# Patient Record
Sex: FEMALE | Race: WHITE | NOT HISPANIC OR LATINO | Employment: FULL TIME | URBAN - METROPOLITAN AREA
[De-identification: names, ages, dates, MRNs, and addresses within clinical notes are randomized per-mention and may not be internally consistent; named-entity substitution may affect disease eponyms.]

---

## 2018-11-30 ENCOUNTER — APPOINTMENT (OUTPATIENT)
Dept: URGENT CARE | Age: 43
End: 2018-11-30

## 2018-11-30 DIAGNOSIS — Z02.1 PHYSICAL EXAM, PRE-EMPLOYMENT: Primary | ICD-10-CM

## 2018-11-30 PROCEDURE — 86787 VARICELLA-ZOSTER ANTIBODY: CPT | Performed by: NURSE PRACTITIONER

## 2018-11-30 PROCEDURE — 86765 RUBEOLA ANTIBODY: CPT | Performed by: NURSE PRACTITIONER

## 2018-11-30 PROCEDURE — 86735 MUMPS ANTIBODY: CPT | Performed by: NURSE PRACTITIONER

## 2018-11-30 PROCEDURE — 86762 RUBELLA ANTIBODY: CPT | Performed by: NURSE PRACTITIONER

## 2018-11-30 PROCEDURE — 86480 TB TEST CELL IMMUN MEASURE: CPT | Performed by: NURSE PRACTITIONER

## 2018-12-02 LAB — RUBV IGG SERPL IA-ACNC: >175 IU/ML

## 2018-12-03 LAB
GAMMA INTERFERON BACKGROUND BLD IA-ACNC: 0.03 IU/ML
M TB IFN-G BLD-IMP: NEGATIVE
M TB IFN-G CD4+ BCKGRND COR BLD-ACNC: -0.01 IU/ML
M TB IFN-G CD4+ BCKGRND COR BLD-ACNC: -0.01 IU/ML
MITOGEN IGNF BCKGRD COR BLD-ACNC: >10 IU/ML

## 2018-12-04 LAB
MEV IGG SER QL: NORMAL
MUV IGG SER QL: NORMAL
VZV IGG SER IA-ACNC: NORMAL

## 2019-04-23 ENCOUNTER — APPOINTMENT (OUTPATIENT)
Dept: LAB | Facility: HOSPITAL | Age: 44
End: 2019-04-23
Attending: OBSTETRICS & GYNECOLOGY
Payer: COMMERCIAL

## 2019-04-23 ENCOUNTER — TRANSCRIBE ORDERS (OUTPATIENT)
Dept: ADMINISTRATIVE | Facility: HOSPITAL | Age: 44
End: 2019-04-23

## 2019-04-23 DIAGNOSIS — N92.4 EXCESSIVE BLEEDING IN PREMENOPAUSAL PERIOD: Primary | ICD-10-CM

## 2019-04-23 DIAGNOSIS — N92.4 EXCESSIVE BLEEDING IN PREMENOPAUSAL PERIOD: ICD-10-CM

## 2019-04-23 LAB
BASOPHILS # BLD AUTO: 0.09 THOUSANDS/ΜL (ref 0–0.1)
BASOPHILS NFR BLD AUTO: 1 % (ref 0–1)
EOSINOPHIL # BLD AUTO: 0.26 THOUSAND/ΜL (ref 0–0.61)
EOSINOPHIL NFR BLD AUTO: 3 % (ref 0–6)
ERYTHROCYTE [DISTWIDTH] IN BLOOD BY AUTOMATED COUNT: 17.2 % (ref 11.6–15.1)
HCT VFR BLD AUTO: 35.8 % (ref 34.8–46.1)
HGB BLD-MCNC: 10.6 G/DL (ref 11.5–15.4)
IMM GRANULOCYTES # BLD AUTO: 0.02 THOUSAND/UL (ref 0–0.2)
IMM GRANULOCYTES NFR BLD AUTO: 0 % (ref 0–2)
LYMPHOCYTES # BLD AUTO: 2.44 THOUSANDS/ΜL (ref 0.6–4.47)
LYMPHOCYTES NFR BLD AUTO: 26 % (ref 14–44)
MCH RBC QN AUTO: 24.8 PG (ref 26.8–34.3)
MCHC RBC AUTO-ENTMCNC: 29.6 G/DL (ref 31.4–37.4)
MCV RBC AUTO: 84 FL (ref 82–98)
MONOCYTES # BLD AUTO: 0.6 THOUSAND/ΜL (ref 0.17–1.22)
MONOCYTES NFR BLD AUTO: 6 % (ref 4–12)
NEUTROPHILS # BLD AUTO: 5.9 THOUSANDS/ΜL (ref 1.85–7.62)
NEUTS SEG NFR BLD AUTO: 64 % (ref 43–75)
NRBC BLD AUTO-RTO: 0 /100 WBCS
PLATELET # BLD AUTO: 405 THOUSANDS/UL (ref 149–390)
PMV BLD AUTO: 8.6 FL (ref 8.9–12.7)
RBC # BLD AUTO: 4.28 MILLION/UL (ref 3.81–5.12)
WBC # BLD AUTO: 9.31 THOUSAND/UL (ref 4.31–10.16)

## 2019-04-23 PROCEDURE — 85025 COMPLETE CBC W/AUTO DIFF WBC: CPT

## 2019-04-23 PROCEDURE — 36415 COLL VENOUS BLD VENIPUNCTURE: CPT

## 2019-11-29 ENCOUNTER — APPOINTMENT (EMERGENCY)
Dept: RADIOLOGY | Facility: HOSPITAL | Age: 44
End: 2019-11-29
Payer: COMMERCIAL

## 2019-11-29 ENCOUNTER — HOSPITAL ENCOUNTER (EMERGENCY)
Facility: HOSPITAL | Age: 44
Discharge: HOME | End: 2019-11-29
Attending: EMERGENCY MEDICINE | Admitting: EMERGENCY MEDICINE
Payer: COMMERCIAL

## 2019-11-29 VITALS
HEART RATE: 89 BPM | DIASTOLIC BLOOD PRESSURE: 93 MMHG | WEIGHT: 244.27 LBS | RESPIRATION RATE: 16 BRPM | TEMPERATURE: 97.5 F | OXYGEN SATURATION: 98 % | SYSTOLIC BLOOD PRESSURE: 195 MMHG

## 2019-11-29 DIAGNOSIS — S52.121A RIGHT RADIAL HEAD FRACTURE: Primary | ICD-10-CM

## 2019-11-29 PROCEDURE — 99284 EMERGENCY DEPT VISIT MOD MDM: CPT

## 2019-11-29 PROCEDURE — 99284 EMERGENCY DEPT VISIT MOD MDM: CPT | Performed by: EMERGENCY MEDICINE

## 2019-11-29 PROCEDURE — 96372 THER/PROPH/DIAG INJ SC/IM: CPT

## 2019-11-29 PROCEDURE — 73080 X-RAY EXAM OF ELBOW: CPT

## 2019-11-29 PROCEDURE — 73110 X-RAY EXAM OF WRIST: CPT

## 2019-11-29 RX ORDER — ALPRAZOLAM 0.25 MG/1
0.25 TABLET ORAL DAILY
COMMUNITY

## 2019-11-29 RX ORDER — KETOROLAC TROMETHAMINE 30 MG/ML
15 INJECTION, SOLUTION INTRAMUSCULAR; INTRAVENOUS ONCE
Status: COMPLETED | OUTPATIENT
Start: 2019-11-29 | End: 2019-11-29

## 2019-11-29 RX ORDER — ACETAMINOPHEN 325 MG/1
975 TABLET ORAL ONCE
Status: COMPLETED | OUTPATIENT
Start: 2019-11-29 | End: 2019-11-29

## 2019-11-29 RX ORDER — DULOXETIN HYDROCHLORIDE 30 MG/1
90 CAPSULE, DELAYED RELEASE ORAL DAILY
COMMUNITY
Start: 2019-08-29

## 2019-11-29 RX ORDER — BUPROPION HYDROCHLORIDE 300 MG/1
300 TABLET ORAL DAILY
COMMUNITY
Start: 2019-08-29

## 2019-11-29 RX ORDER — ATORVASTATIN CALCIUM 80 MG/1
80 TABLET, FILM COATED ORAL DAILY
COMMUNITY
Start: 2019-06-15

## 2019-11-29 RX ORDER — AMLODIPINE BESYLATE 5 MG/1
5 TABLET ORAL DAILY
COMMUNITY
Start: 2019-11-18

## 2019-11-29 RX ADMIN — ACETAMINOPHEN 975 MG: 325 TABLET ORAL at 20:22

## 2019-11-29 RX ADMIN — KETOROLAC TROMETHAMINE 15 MG: 30 INJECTION, SOLUTION INTRAMUSCULAR at 20:22

## 2019-11-30 NOTE — ED PROVIDER NOTES
History  Chief Complaint   Patient presents with    Arm Injury - Major     Pt c/o right arm, elbow, and wrist pain, reports tripped and fell and landed on right arm  Patient is a 78-year-old female who presents for right arm pain after a fall  Patient says that she was walking outside when she lost balance going up an incline and she fell on an outstretched right hand  Patient says that she landed on her right side  She did not hit her head or lose consciousness and is not on any blood thinners  She admits to pain in her right forearm that was traveling up into her biceps  She denies any numbness or tingling in the arm or hand  She has not taken anything for the discomfort  There is no obvious deformity on exam           Prior to Admission Medications   Prescriptions Last Dose Informant Patient Reported? Taking? ALPRAZolam (XANAX) 0 25 mg tablet   Yes Yes   Sig: Take 0 25 mg by mouth daily   DULoxetine (CYMBALTA) 30 mg delayed release capsule   Yes Yes   Sig: Take 90 mg by mouth daily   amLODIPine (NORVASC) 5 mg tablet   Yes Yes   Sig: Take 5 mg by mouth daily   atorvastatin (LIPITOR) 80 mg tablet   Yes Yes   Sig: Take 80 mg by mouth daily   buPROPion (WELLBUTRIN XL) 300 mg 24 hr tablet   Yes Yes   Sig: Take 300 mg by mouth daily      Facility-Administered Medications: None       Past Medical History:   Diagnosis Date    Anxiety     Depression     Hyperlipidemia     Hypertension        History reviewed  No pertinent surgical history  History reviewed  No pertinent family history  I have reviewed and agree with the history as documented  Social History     Tobacco Use    Smoking status: Never Smoker    Smokeless tobacco: Never Used   Substance Use Topics    Alcohol use: Yes     Comment: rarely    Drug use: Never        Review of Systems   Constitutional: Negative for chills, diaphoresis and fever  HENT: Negative for congestion, sinus pressure, sore throat and trouble swallowing  Eyes: Negative for pain, discharge and itching  Respiratory: Negative for cough, chest tightness, shortness of breath and wheezing  Cardiovascular: Negative for chest pain, palpitations and leg swelling  Gastrointestinal: Negative for abdominal distention, abdominal pain, blood in stool, diarrhea, nausea and vomiting  Endocrine: Negative for polyphagia and polyuria  Genitourinary: Negative for difficulty urinating, dysuria, flank pain, hematuria, pelvic pain and vaginal bleeding  Musculoskeletal: Positive for arthralgias (Right wrist, forearm, and elbow)  Negative for back pain  Skin: Negative for color change and rash  Neurological: Negative for dizziness, syncope, weakness, light-headedness and headaches  Physical Exam  ED Triage Vitals   Temperature Pulse Respirations Blood Pressure SpO2   11/29/19 1942 11/29/19 1946 11/29/19 1946 11/29/19 1946 11/29/19 1946   97 5 °F (36 4 °C) 89 16 (!) 195/93 98 %      Temp Source Heart Rate Source Patient Position - Orthostatic VS BP Location FiO2 (%)   11/29/19 1942 -- 11/29/19 1946 11/29/19 1946 --   Temporal  Sitting Left arm       Pain Score       11/29/19 1946       4             Orthostatic Vital Signs  Vitals:    11/29/19 1946   BP: (!) 195/93   Pulse: 89   Patient Position - Orthostatic VS: Sitting       Physical Exam   Constitutional: She is oriented to person, place, and time  She appears well-developed and well-nourished  No distress  HENT:   Head: Normocephalic and atraumatic  Right Ear: External ear normal    Left Ear: External ear normal    Mouth/Throat: No oropharyngeal exudate  Eyes: Pupils are equal, round, and reactive to light  Conjunctivae are normal    Neck: Normal range of motion  Neck supple  Cardiovascular: Normal rate, regular rhythm, normal heart sounds and intact distal pulses  Exam reveals no gallop and no friction rub  No murmur heard    Pulmonary/Chest: Effort normal and breath sounds normal  No respiratory distress  She has no wheezes  She has no rales  Abdominal: Soft  She exhibits no distension  There is no tenderness  There is no guarding  Musculoskeletal: Normal range of motion  She exhibits tenderness (Right dorsal wrist, right forearm and right elbow)  She exhibits no edema or deformity  Right upper extremity is neurovascularly intact   Lymphadenopathy:     She has no cervical adenopathy  Neurological: She is alert and oriented to person, place, and time  No cranial nerve deficit or sensory deficit  She exhibits normal muscle tone  Skin: Skin is warm and dry  Psychiatric: She has a normal mood and affect  Nursing note and vitals reviewed  ED Medications  Medications   ketorolac (TORADOL) injection 15 mg (15 mg Intramuscular Given 11/29/19 2022)   acetaminophen (TYLENOL) tablet 975 mg (975 mg Oral Given 11/29/19 2022)       Diagnostic Studies  Results Reviewed     None                 XR elbow 3+ views RIGHT   Final Result by Jett Medina MD (11/29 2029)      Acute radial head fracture, noting a 5 mm displaced bony fragment on the lateral view  Moderate joint effusion  The study was marked in [facility] for immediate notification  Workstation performed: [id number]         XR wrist 3+ views RIGHT   Final Result by Jett Medina MD (11/29 2028)      No acute osseous abnormality              Workstation performed: [id number]               Procedures  Splint application  Date/Time: 11/29/2019 11:34 PM  Performed by: Josephine Baugh DO  Authorized by: Josephine Baugh DO     Patient location:  Bedside  Performing Provider:  Resident  Other Assisting Provider: Yes (comment) (Technician)    Consent:     Consent obtained:  Verbal    Consent given by:  Patient    Risks discussed:  Discoloration, numbness and pain    Alternatives discussed:  No treatment  Universal protocol:     Patient identity confirmed:  Verbally with patient  Indication:     Indications: fracture Pre-procedure details:     Sensation:  Normal  Procedure details:     Laterality:  Right    Location:  Elbow    Elbow:  R elbow    Strapping: no      Splint type:  Long arm    Supplies:  Ortho-Glass  Post-procedure details:     Pain:  Improved    Sensation:  Normal    Neurovascular Exam: skin pink and capillary refill <2 sec      Patient tolerance of procedure: Tolerated well, no immediate complications            ED Course                               MDM  Number of Diagnoses or Management Options  Right radial head fracture:   Diagnosis management comments: 59-year-old female presenting for right wrist, forearm and elbow discomfort after mechanical fall  Did not hit her lip head or lose conscious  Right arm is neurovascular intact  No obvious deformity on exam   Will obtain x-ray of right wrist, right elbow  Will give Toradol and Tylenol for symptom relief  X-ray shows right radial head fracture  Patient placed in a posterior long-arm splint  Given follow-up for Orthopedics  Disposition  Final diagnoses:   Right radial head fracture     Time reflects when diagnosis was documented in both MDM as applicable and the Disposition within this note     Time User Action Codes Description Comment    11/29/2019  9:02 PM Mani Drilling Add [K95 718H] Right radial head fracture       ED Disposition     ED Disposition Condition Date/Time Comment    Discharge Stable Fri Nov 29, 2019  9:01 PM Loulou Mendoza discharge to home/self care              Follow-up Information     Follow up With Specialties Details Why Contact Info    Tamela Shields MD Orthopedic Surgery Schedule an appointment as soon as possible for a visit  For follow up of Radial head fracture [address]  [phone]            Discharge Medication List as of 11/29/2019  9:03 PM      CONTINUE these medications which have NOT CHANGED    Details   ALPRAZolam (XANAX) 0 25 mg tablet Take 0 25 mg by mouth daily, Historical Med amLODIPine (NORVASC) 5 mg tablet Take 5 mg by mouth daily, Starting Mon 11/18/2019, Historical Med      atorvastatin (LIPITOR) 80 mg tablet Take 80 mg by mouth daily, Starting Sat 6/15/2019, Historical Med      buPROPion (WELLBUTRIN XL) 300 mg 24 hr tablet Take 300 mg by mouth daily, Starting Thu 8/29/2019, Historical Med      DULoxetine (CYMBALTA) 30 mg delayed release capsule Take 90 mg by mouth daily, Starting Thu 8/29/2019, Historical Med           No discharge procedures on file  ED Provider  Attending physically available and evaluated Franck Duron I managed the patient along with the ED Attending      Electronically Signed by         Esha Escobedo DO  11/29/19 6192

## 2019-11-30 NOTE — ED ATTENDING ATTESTATION
11/29/2019  I, Manoj Hadley MD, saw and evaluated the patient  I have discussed the patient with the resident/non-physician practitioner and agree with the resident's/non-physician practitioner's findings, Plan of Care, and MDM as documented in the resident's/non-physician practitioner's note, except where noted  All available labs and Radiology studies were reviewed  I was present for key portions of any procedure(s) performed by the resident/non-physician practitioner and I was immediately available to provide assistance  At this point I agree with the current assessment done in the Emergency Department  I have conducted an independent evaluation of this patient a history and physical is as follows:    47-year-old female presents for evaluation of right elbow/forearm pain status post mechanical fall  She denies striking her head, loss of consciousness, other traumatic injuries, paresthesias  Ten systems reviewed otherwise negative  Exam no distress, HEENT trauma normal, no tenderness to palpation of cervical thoracic and lumbar spines, lungs/cardiac/abdomen/pelvis/left upper extremity/bilateral lower extremity trauma exam is within normal limits, right shoulder exam within normal limits, patient is tender palpation over the right elbow and right forearm, neurovascular intact in this extremity    Medical decision making;-mechanical fall-will x-ray right elbow/forearm/wrist to rule out fracture/dislocation, reassess    ED Course         Critical Care Time  Procedures

## 2019-12-02 ENCOUNTER — TELEPHONE (OUTPATIENT)
Dept: OBGYN CLINIC | Facility: MEDICAL CENTER | Age: 44
End: 2019-12-02

## 2019-12-02 ENCOUNTER — TELEPHONE (OUTPATIENT)
Dept: OBGYN CLINIC | Facility: HOSPITAL | Age: 44
End: 2019-12-02

## 2019-12-02 NOTE — TELEPHONE ENCOUNTER
Patient referred to Dr Dale Castellano  His schedule currently full  Emailed Practice admin to see if she can be scheduled

## 2019-12-02 NOTE — TELEPHONE ENCOUNTER
Patient seen in ER, xray of right elbow done  Acute radial head fracture  Please take a look at the xray and let me know if you would be willing to see this [employer] employee  Thank you very much!

## 2019-12-03 ENCOUNTER — OFFICE VISIT (OUTPATIENT)
Dept: OBGYN CLINIC | Facility: HOSPITAL | Age: 44
End: 2019-12-03
Payer: COMMERCIAL

## 2019-12-03 VITALS
DIASTOLIC BLOOD PRESSURE: 127 MMHG | WEIGHT: 244 LBS | BODY MASS INDEX: 34.16 KG/M2 | HEART RATE: 87 BPM | HEIGHT: 71 IN | SYSTOLIC BLOOD PRESSURE: 173 MMHG

## 2019-12-03 DIAGNOSIS — M25.531 PAIN IN RIGHT WRIST: ICD-10-CM

## 2019-12-03 DIAGNOSIS — S52.124A CLOSED NONDISPLACED FRACTURE OF HEAD OF RIGHT RADIUS, INITIAL ENCOUNTER: Primary | ICD-10-CM

## 2019-12-03 PROCEDURE — 99203 OFFICE O/P NEW LOW 30 MIN: CPT | Performed by: ORTHOPAEDIC SURGERY

## 2019-12-03 RX ORDER — CARBAMAZEPINE 300 MG/1
300 CAPSULE, EXTENDED RELEASE ORAL 2 TIMES DAILY
COMMUNITY
Start: 2019-08-30

## 2019-12-03 RX ORDER — DULOXETIN HYDROCHLORIDE 60 MG/1
CAPSULE, DELAYED RELEASE ORAL
COMMUNITY
Start: 2019-08-30

## 2019-12-03 RX ORDER — ACETAMINOPHEN AND CODEINE PHOSPHATE 120; 12 MG/5ML; MG/5ML
SOLUTION ORAL
Refills: 4 | COMMUNITY
Start: 2019-11-04 | End: 2021-08-17 | Stop reason: ALTCHOICE

## 2019-12-03 RX ORDER — MELOXICAM 15 MG/1
15 TABLET ORAL DAILY
Qty: 30 TABLET | Refills: 0 | Status: SHIPPED | OUTPATIENT
Start: 2019-12-03 | End: 2021-08-17 | Stop reason: ALTCHOICE

## 2019-12-03 NOTE — PROGRESS NOTES
Assessment:  1  Closed nondisplaced fracture of head of right radius, initial encounter  Ambulatory referral to Physical Therapy    meloxicam (MOBIC) 15 mg tablet   2  Pain in right wrist  Ambulatory referral to Physical Therapy    meloxicam (MOBIC) 15 mg tablet       Plan:  Surgery is not recommended  The patient should remain in sling yet can do mild ROM per patient tolerance  She is provided with right wrist splint  She should initiate physical therapy  She should return in 3 weeks  The patient is advised to see her PCP today or tomorrow for elevated blood pressure  To do next visit:  Return in about 3 weeks (around 12/24/2019)  The above stated was discussed in layman's terms and the patient expressed understanding  All questions were answered to the patient's satisfaction  Scribe Attestation    I,:   Orlando Villafuerte am acting as a scribe while in the presence of the attending physician :        I,:   Missy Chau MD personally performed the services described in this documentation    as scribed in my presence :              Subjective:   Ariana Bland is a RHD  53 y o  female who presents for right distal radius fracture sustained 11/29/19  She had fallen on out stretched arm and was seen at the ED including x-rays, sling and splint  Today she complains of palmar hand pain and right forearm pain to the elbow  She rates her pain at 2/10 and 10/10 with movement  Moving the hand and elbow aggravates  Remaining still alleviates  She did use IBU 800mg and Excedrin with some benefit  She is currently not working as she is in significant pain  She denies tingling or numbness  Review of systems negative unless otherwise specified in HPI    Past Medical History:   Diagnosis Date    Anxiety     Depression     Hyperlipidemia     Hypertension        History reviewed  No pertinent surgical history  History reviewed  No pertinent family history      Social History Occupational History    Not on file   Tobacco Use    Smoking status: Never Smoker    Smokeless tobacco: Never Used   Substance and Sexual Activity    Alcohol use: Yes     Comment: rarely    Drug use: Never    Sexual activity: Not on file         Current Outpatient Medications:     ALPRAZolam (XANAX) 0 25 mg tablet, Take 0 25 mg by mouth daily, Disp: , Rfl:     amLODIPine (NORVASC) 5 mg tablet, Take 5 mg by mouth daily, Disp: , Rfl:     atorvastatin (LIPITOR) 80 mg tablet, Take 80 mg by mouth daily, Disp: , Rfl:     buPROPion (WELLBUTRIN XL) 300 mg 24 hr tablet, Take 300 mg by mouth daily, Disp: , Rfl:     carBAMazepine (CARBATROL) 300 MG 12 hr capsule, , Disp: , Rfl:     DULoxetine (CYMBALTA) 30 mg delayed release capsule, Take 90 mg by mouth daily, Disp: , Rfl:     DULoxetine (CYMBALTA) 60 mg delayed release capsule, , Disp: , Rfl:     norethindrone (MICRONOR) 0 35 MG tablet, , Disp: , Rfl: 4    Allergies   Allergen Reactions    Penicillins Hives    Rofecoxib GI Intolerance    Sulfa Antibiotics Rash    Sulfamethoxazole-Trimethoprim Rash            Vitals:    12/03/19 1556   BP: (!) 173/127   Pulse: 87       Objective:  Physical exam  · General: Awake, Alert, Oriented  · Eyes: Pupils equal, round and reactive to light  · Heart: regular rate and rhythm  · Lungs: No audible wheezing  · Abdomen: soft                    Ortho Exam   Right upper extremity:  Splint and sling removed for exam  Non-tender right shoulder and collar bone  Guarded elbow extension and flexion  Patient able to initiate supination and pronation, limited ROM due to pain  Mild tenderness over distal radius  TTP over proximal radius  Wrist compartments soft and supple  Sensation intact    Diagnostics, reviewed and taken today if performed as documented:     The attending physician has personally reviewed the pertinent films in PACS and interpretation is as follows:  Right radius x-ray:  Non-displaced Radial head fracture  Procedures, if performed today:    Procedures    None performed      Portions of the record may have been created with voice recognition software  Occasional wrong word or "sound a like" substitutions may have occurred due to the inherent limitations of voice recognition software  Read the chart carefully and recognize, using context, where substitutions have occurred

## 2019-12-11 ENCOUNTER — EVALUATION (OUTPATIENT)
Dept: PHYSICAL THERAPY | Facility: REHABILITATION | Age: 44
End: 2019-12-11
Payer: COMMERCIAL

## 2019-12-11 DIAGNOSIS — M25.531 PAIN IN RIGHT WRIST: ICD-10-CM

## 2019-12-11 DIAGNOSIS — S52.124D CLOSED NONDISPLACED FRACTURE OF HEAD OF RIGHT RADIUS WITH ROUTINE HEALING, SUBSEQUENT ENCOUNTER: Primary | ICD-10-CM

## 2019-12-11 PROCEDURE — 97110 THERAPEUTIC EXERCISES: CPT | Performed by: PHYSICAL THERAPIST

## 2019-12-11 PROCEDURE — 97162 PT EVAL MOD COMPLEX 30 MIN: CPT | Performed by: PHYSICAL THERAPIST

## 2019-12-11 NOTE — PROGRESS NOTES
PT Evaluation     Today's date: 2019  Patient name: Ginger Aguilar  : 1975  MRN: [id number]  Referring provider: Meme Stanley MD  Dx:   Encounter Diagnosis     ICD-10-CM    1  Closed nondisplaced fracture of head of right radius with routine healing, subsequent encounter S52 124D    2  Pain in right wrist M25 531        Start Time: 1710  Stop Time: 1810  Total time in clinic (min): 60 minutes    Assessment  Assessment details: Pt is a 53year old right-handed female presenting to PT approximately 2 weeks s/p right radial head fracture and wrist sprain s/p fall  Pt presents with deficits in right wrist and elbow range of motion and strength, as well as moderate edema and pain  Due to these deficits, Mary Mckenzie is limited in his/her ability to perform the following functional activities:  all ADLs, feeding herself, brushing her teeth, putting hair in ponytail, all lifting, twisting motions, getting dressed, and work activities (managing cash bags)  Pt is a good candidate for skilled PT intervention and will benefit from treatment in order to address her limitations and to restore maximal function  Impairments: abnormal coordination, abnormal or restricted ROM, activity intolerance, impaired physical strength and pain with function  Understanding of Dx/Px/POC: good   Prognosis: good    Goals  Short Term Goals: To be achieved by 4 weeks    1) Patient to be independent with basic HEP  2) Decrease pain to 5/10 at worst   3) Increase ROM by 5 degrees or greater in all deficient planes  4) Increase strength by 1/2 MMT grade or greater in all deficient planes  Long Term Goals: To be achieved by discharge    1) FOTO equal to or greater than 47   2) Patient to be independent with comprehensive HEP  3) Decrease pain to 2/10 at worst  for improved quality of life  4) Increase strength to 5/5 MMT grade in all deficient planes to improve a/iadls     5) Increase ROM to within normal limits  Plan  Patient would benefit from: PT eval and skilled PT  Planned modality interventions: cryotherapy and thermotherapy: hydrocollator packs  Planned therapy interventions: IADL retraining, body mechanics training, flexibility, functional ROM exercises, home exercise program, neuromuscular re-education, manual therapy, postural training, strengthening, stretching, therapeutic activities, therapeutic exercise and joint mobilization  Frequency: 2x week  Duration in visits: 8  Duration in weeks: 4  Treatment plan discussed with: patient        Subjective Evaluation    History of Present Illness  Mechanism of injury: Pt is a 53year old right-handed female presenting to PT approximately 2 weeks s/p right radial head fracture and wrist sprain s/p fall  Pt reports she was at the [address] [place] [address] to see the lights, was trying to get in line in dark area  She tripped over handicapped ramp and landed with her hands out in front  She had immediate right wrist and elbow pain, was taken to ER and radial head fracture confirmed  Pt placed in soft-splint and referred to Dr Karen Proctor  Pt placed patient in right wrist splint, referred to OPPT  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  Pain Location: right elbow and wrist    Pain Type: varies, aching, sharp depending upon movement    Pain Intensity:  Current: 0  Best: 0  Worst: 10    Pt reports increased pain and/or difficulty with: all ADLs, feeding herself, brushing her teeth, putting hair in ponytail, all lifting, twisting motions, getting dressed, work activities (managing cash bags); pt denies sleep disturbance       Pt reports decreased pain with: ice, medication            Not a recurrent problem   Quality of life: good      Diagnostic Tests  X-ray: abnormal  Patient Goals  Patient goals for therapy: decreased pain, decreased edema, increased motion, increased strength and independence with ADLs/IADLs          Objective     Observations     Additional Observation Details  Moderate right elbow, distal forearm, thenar ecchymosis and edema  Tenderness     Right Elbow   Tenderness in the radial head, radiocapitellar joint and proximal radioulnar joint  Right Wrist/Hand   Tenderness in the proximal radioulnar joint and scaphoid  Neurological Testing     Sensation     Elbow   Left Elbow   Inact: light touch    Right Elbow   Intact: light touch    Active Range of Motion     Left Wrist   Normal active range of motion    Right Wrist   Wrist flexion: 45 degrees with pain  Wrist extension: 15 degrees with pain  Radial deviation: 10 degrees with pain  Ulnar deviation: 10 degrees with pain    Passive Range of Motion     Left Elbow   Normal passive range of motion    Right Elbow   Flexion: 125 degrees with pain  Extension: -30 degrees with pain    Strength/Myotome Testing     Left Elbow   Flexion: 5  Extension: 5  Forearm supination: 5  Forearm pronation: 5    Left Wrist/Hand   Wrist extension: 5  Wrist flexion: 5  Radial deviation: 5  Ulnar deviation: 5     (2nd hand position)     Trial 1: 60    Right Wrist/Hand      (2nd hand position)     Trial 1: 20    Additional Strength Details  R elbow strength not tested secondary to acuity of fracture    R wrist strength not tested secondary to acuity of sprain        Precautions: anxiety, depression, HLD, HTN    Daily Treatment Diary     Assessment 12/11            Eval/Reval MD            FOTO 4/47    **     **   POC Signed             HEP Issued  Yes            Manuals 12/11            R wrist PROM             R elbow PROM             Exercise Diary              Elbow AROM:  Flex/Ext 5"x10            Elbow AROM:  Pro/Sup 5"x10            Wrist AROM:  Flex/Ext 5"x10            Wrist AROM:  RD/UD             Digiflex - Yellow                                                                                                                                                            Modalities 12/11            CP R wrist & elbow NV HEP: wrist AROM, elbow AROM (12/11/19)            Flowsheet Rows      Most Recent Value   PT/OT G-Codes   Current Score  4   Projected Score  47

## 2019-12-18 ENCOUNTER — OFFICE VISIT (OUTPATIENT)
Dept: PHYSICAL THERAPY | Facility: REHABILITATION | Age: 44
End: 2019-12-18
Payer: COMMERCIAL

## 2019-12-18 DIAGNOSIS — S52.124D CLOSED NONDISPLACED FRACTURE OF HEAD OF RIGHT RADIUS WITH ROUTINE HEALING, SUBSEQUENT ENCOUNTER: Primary | ICD-10-CM

## 2019-12-18 DIAGNOSIS — M25.531 PAIN IN RIGHT WRIST: ICD-10-CM

## 2019-12-18 PROCEDURE — 97112 NEUROMUSCULAR REEDUCATION: CPT | Performed by: PHYSICAL THERAPIST

## 2019-12-18 PROCEDURE — 97140 MANUAL THERAPY 1/> REGIONS: CPT | Performed by: PHYSICAL THERAPIST

## 2019-12-18 NOTE — PROGRESS NOTES
Daily Note     Today's date: 2019  Patient name: Loulou Mendoza  : 1975  MRN: [id number]  Referring provider: Reymundo Ruiz MD  Dx:   Encounter Diagnosis     ICD-10-CM    1  Closed nondisplaced fracture of head of right radius with routine healing, subsequent encounter S52 124D    2  Pain in right wrist M25 531                   Subjective: Pt reports she twisted her right wrist "awkwardly" last night while helping out at [employer] and had intense sharp pain  She reports increased pain today, and with pain with moving her wrist and elbow that wasn't present prior to last evening  Objective: See treatment diary below  Precautions: anxiety, depression, HLD, HTN    Daily Treatment Diary   Assessment            Pancho/Reval MD            FOTO     **     **   HEP Issued  Yes            Manuals            R wrist PROM  MD           R elbow PROM  MD           Exercise Diary             Elbow AROM:  Flex/Ext 5"x10 5"x20           Elbow AROM:  Pro/Sup 5"x10 5"x15           Wrist AROM:  Flex/Ext 5"x10 5"x20           Wrist AROM:  RD/UD  5"x20           Digiflex - Yellow  20                                                                                                      Modalities            MHP R wrist & elbow  Pre-Tx NV 10'           CP R wrist & elbow  Post-Tx  10'             HEP: wrist AROM, elbow AROM (19)      Assessment: Pt with good tolerance to progression of program and initiation of manual techniques despite c/o increased pain levels since yesterday  Pt advised to contact Dr Zo Hampton to increased pain levels since her incident yesterday  She continues with limitation in active and passive ROM, with most pain noted with supination  Pt will benefit from continued skilled PT intervention in order to address their remaining limitations and to restore maximal function  Plan: Continue per plan of care  Progress treatment as tolerated

## 2019-12-26 ENCOUNTER — OFFICE VISIT (OUTPATIENT)
Dept: PHYSICAL THERAPY | Facility: REHABILITATION | Age: 44
End: 2019-12-26
Payer: COMMERCIAL

## 2019-12-26 DIAGNOSIS — M25.531 PAIN IN RIGHT WRIST: ICD-10-CM

## 2019-12-26 DIAGNOSIS — S52.124D CLOSED NONDISPLACED FRACTURE OF HEAD OF RIGHT RADIUS WITH ROUTINE HEALING, SUBSEQUENT ENCOUNTER: Primary | ICD-10-CM

## 2019-12-26 PROCEDURE — 97140 MANUAL THERAPY 1/> REGIONS: CPT | Performed by: PHYSICAL THERAPIST

## 2019-12-26 NOTE — PROGRESS NOTES
Daily Note     Today's date: 2019  Patient name: Meseret Sauceda  : 1975  MRN: [id number]  Referring provider: Juanpablo Mccann MD  Dx:   Encounter Diagnosis     ICD-10-CM    1  Closed nondisplaced fracture of head of right radius with routine healing, subsequent encounter S52 124D    2  Pain in right wrist M25 531        Start Time: 1725  Stop Time: 1755  Total time in clinic (min): 30 minutes    Subjective: Pt says that her elbow is starting to getting better, but her wrist has been bothering her more recently  She says she has muscle spasms in her R wrist very frequently  Objective: See treatment diary below  Assessment: Pt is limited in elbow flexion and extension AROM  Pt also had muscle tightness throughout her R elbow and forearm, and soft tissue work was done to loosen it up  Pt was tender to palpation over the radial head and near the TFCC  MHP was given after tx to relieve some of her soreness, and pt reported that her elbow and wrist felt great and looser after tx  Pt  Was instructed to use ice if it becomes painful later but otherwise to stick with heat for better healing  She is to continue to wear her wrist brace to protect her TFCC  Plan: Continue per plan of care            Precautions: anxiety, depression, HLD, HTN    Daily Treatment Diary   Assessment           Pancho/Reval MD            FOTO     **     **   HEP Issued  Yes            Manuals           R wrist PROM  MD LORENZ          R elbow PROM  MD LORENZ          R forearm STM   DK          Radial head mob   Gr II MW          TFCC glide   Gr II MW          Exercise Diary            Elbow AROM:  Flex/Ext 5"x10 5"x20           Elbow AROM:  Pro/Sup 5"x10 5"x15           Wrist AROM:  Flex/Ext 5"x10 5"x20           Wrist AROM:  RD/UD  5"x20           Digiflex - Yellow  20 hold Modalities 12/11 12/18 12/26          MHP R wrist & elbow  Pre-Tx NV 10' 10' post tx          CP R wrist & elbow  Post-Tx  10'             HEP: wrist AROM, elbow AROM (12/11/19)

## 2020-01-02 ENCOUNTER — OFFICE VISIT (OUTPATIENT)
Dept: PHYSICAL THERAPY | Facility: REHABILITATION | Age: 45
End: 2020-01-02
Payer: COMMERCIAL

## 2020-01-02 ENCOUNTER — TRANSCRIBE ORDERS (OUTPATIENT)
Dept: ADMINISTRATIVE | Facility: HOSPITAL | Age: 45
End: 2020-01-02

## 2020-01-02 ENCOUNTER — APPOINTMENT (OUTPATIENT)
Dept: LAB | Age: 45
End: 2020-01-02
Payer: COMMERCIAL

## 2020-01-02 DIAGNOSIS — Z79.899 ENCOUNTER FOR LONG-TERM (CURRENT) USE OF OTHER MEDICATIONS: ICD-10-CM

## 2020-01-02 DIAGNOSIS — S52.124D CLOSED NONDISPLACED FRACTURE OF HEAD OF RIGHT RADIUS WITH ROUTINE HEALING, SUBSEQUENT ENCOUNTER: Primary | ICD-10-CM

## 2020-01-02 DIAGNOSIS — I10 ESSENTIAL HYPERTENSION, MALIGNANT: ICD-10-CM

## 2020-01-02 DIAGNOSIS — E78.2 MIXED HYPERLIPIDEMIA: ICD-10-CM

## 2020-01-02 DIAGNOSIS — I10 ESSENTIAL HYPERTENSION, MALIGNANT: Primary | ICD-10-CM

## 2020-01-02 DIAGNOSIS — E78.2 MIXED HYPERLIPIDEMIA: Primary | ICD-10-CM

## 2020-01-02 DIAGNOSIS — M25.531 PAIN IN RIGHT WRIST: ICD-10-CM

## 2020-01-02 DIAGNOSIS — R53.82 CHRONIC FATIGUE SYNDROME: ICD-10-CM

## 2020-01-02 LAB
ALBUMIN SERPL BCP-MCNC: 3.9 G/DL (ref 3.5–5)
ALP SERPL-CCNC: 100 U/L (ref 46–116)
ALT SERPL W P-5'-P-CCNC: 85 U/L (ref 12–78)
ANION GAP SERPL CALCULATED.3IONS-SCNC: 4 MMOL/L (ref 4–13)
AST SERPL W P-5'-P-CCNC: 39 U/L (ref 5–45)
BASOPHILS # BLD AUTO: 0.07 THOUSANDS/ΜL (ref 0–0.1)
BASOPHILS NFR BLD AUTO: 1 % (ref 0–1)
BILIRUB SERPL-MCNC: 0.32 MG/DL (ref 0.2–1)
BUN SERPL-MCNC: 12 MG/DL (ref 5–25)
CALCIUM SERPL-MCNC: 9.4 MG/DL (ref 8.3–10.1)
CARBAMAZEPINE SERPL-MCNC: 4.3 UG/ML (ref 4–12)
CHLORIDE SERPL-SCNC: 109 MMOL/L (ref 100–108)
CHOLEST SERPL-MCNC: 178 MG/DL (ref 50–200)
CO2 SERPL-SCNC: 30 MMOL/L (ref 21–32)
CREAT SERPL-MCNC: 0.77 MG/DL (ref 0.6–1.3)
EOSINOPHIL # BLD AUTO: 0.21 THOUSAND/ΜL (ref 0–0.61)
EOSINOPHIL NFR BLD AUTO: 3 % (ref 0–6)
ERYTHROCYTE [DISTWIDTH] IN BLOOD BY AUTOMATED COUNT: 14.5 % (ref 11.6–15.1)
GFR SERPL CREATININE-BSD FRML MDRD: 93 ML/MIN/1.73SQ M
GLUCOSE P FAST SERPL-MCNC: 95 MG/DL (ref 65–99)
HCT VFR BLD AUTO: 43.3 % (ref 34.8–46.1)
HDLC SERPL-MCNC: 51 MG/DL
HGB BLD-MCNC: 14 G/DL (ref 11.5–15.4)
IMM GRANULOCYTES # BLD AUTO: 0.03 THOUSAND/UL (ref 0–0.2)
IMM GRANULOCYTES NFR BLD AUTO: 0 % (ref 0–2)
LDLC SERPL CALC-MCNC: 90 MG/DL (ref 0–100)
LYMPHOCYTES # BLD AUTO: 1.68 THOUSANDS/ΜL (ref 0.6–4.47)
LYMPHOCYTES NFR BLD AUTO: 20 % (ref 14–44)
MAGNESIUM SERPL-MCNC: 2.2 MG/DL (ref 1.6–2.6)
MCH RBC QN AUTO: 30.9 PG (ref 26.8–34.3)
MCHC RBC AUTO-ENTMCNC: 32.3 G/DL (ref 31.4–37.4)
MCV RBC AUTO: 96 FL (ref 82–98)
MONOCYTES # BLD AUTO: 0.49 THOUSAND/ΜL (ref 0.17–1.22)
MONOCYTES NFR BLD AUTO: 6 % (ref 4–12)
NEUTROPHILS # BLD AUTO: 5.74 THOUSANDS/ΜL (ref 1.85–7.62)
NEUTS SEG NFR BLD AUTO: 70 % (ref 43–75)
NONHDLC SERPL-MCNC: 127 MG/DL
NRBC BLD AUTO-RTO: 0 /100 WBCS
PLATELET # BLD AUTO: 308 THOUSANDS/UL (ref 149–390)
PMV BLD AUTO: 8.8 FL (ref 8.9–12.7)
POTASSIUM SERPL-SCNC: 4.1 MMOL/L (ref 3.5–5.3)
PROT SERPL-MCNC: 7.1 G/DL (ref 6.4–8.2)
RBC # BLD AUTO: 4.53 MILLION/UL (ref 3.81–5.12)
SODIUM SERPL-SCNC: 143 MMOL/L (ref 136–145)
TRIGL SERPL-MCNC: 183 MG/DL
TSH SERPL DL<=0.05 MIU/L-ACNC: 1.99 UIU/ML (ref 0.36–3.74)
WBC # BLD AUTO: 8.22 THOUSAND/UL (ref 4.31–10.16)

## 2020-01-02 PROCEDURE — 80061 LIPID PANEL: CPT

## 2020-01-02 PROCEDURE — 84443 ASSAY THYROID STIM HORMONE: CPT

## 2020-01-02 PROCEDURE — 36415 COLL VENOUS BLD VENIPUNCTURE: CPT

## 2020-01-02 PROCEDURE — 97110 THERAPEUTIC EXERCISES: CPT

## 2020-01-02 PROCEDURE — 85025 COMPLETE CBC W/AUTO DIFF WBC: CPT

## 2020-01-02 PROCEDURE — 80053 COMPREHEN METABOLIC PANEL: CPT

## 2020-01-02 PROCEDURE — 97140 MANUAL THERAPY 1/> REGIONS: CPT

## 2020-01-02 PROCEDURE — 83735 ASSAY OF MAGNESIUM: CPT

## 2020-01-02 PROCEDURE — 80156 ASSAY CARBAMAZEPINE TOTAL: CPT

## 2020-01-02 PROCEDURE — 97140 MANUAL THERAPY 1/> REGIONS: CPT | Performed by: PHYSICAL THERAPIST

## 2020-01-02 NOTE — PROGRESS NOTES
Daily Note     Today's date: 2020  Patient name: Katja Lo  : 1975  MRN: [id number]  Referring provider: Piper Manriquez MD  Dx:   Encounter Diagnosis     ICD-10-CM    1  Closed nondisplaced fracture of head of right radius with routine healing, subsequent encounter S52 124D    2  Pain in right wrist M25 531        Start Time: 1450  Stop Time: 1545  Total time in clinic (min): 55 minutes    Subjective: Pt says that her elbow is starting to getting better, but her wrist has been bothering her still  Objective: See treatment diary below  Assessment: Pt is limited in elbow flexion and extension AROM  Pt performed wrist isometrics with no increase in symptoms  Pt shows increased ROM post manuals (performed by PT and PTA)  Pt will benefit from further skilled PT to increase strength, flexibility and function  Continue to progress as able  Plan: Continue per plan of care            Precautions: anxiety, depression, HLD, HTN    Daily Treatment Diary   Assessment          Pancho/Reval MD            FOTO     **     **   HEP Issued  Yes            Manuals          R wrist PROM  MD LORENZ HY         R elbow PROM  MD GERDA FRAZIER         R forearm STM   DK HY         Radial head mob   Gr II MW MD         TFCC glide   Gr II CAMILO CARMEN         Exercise Diary           Elbow AROM:  Flex/Ext 5"x10 5"x20  20x5"         Elbow AROM:  Pro/Sup 5"x10 5"x15  15x5"         Wrist AROM:  Flex/Ext 5"x10 5"x20  20x5"         Wrist AROM:  RD/UD  5"x20  20x5"         Digiflex - Yellow  20 hold                       Wrist ISO 4 ways    10x5"                                                                          Modalities          MHP R wrist & elbow  Pre-Tx NV 10' 10' post tx 10' pre         CP R wrist & elbow  Post-Tx  10'  10'           HEP: wrist AROM, elbow AROM (19)

## 2020-01-08 ENCOUNTER — APPOINTMENT (OUTPATIENT)
Dept: PHYSICAL THERAPY | Facility: REHABILITATION | Age: 45
End: 2020-01-08
Payer: COMMERCIAL

## 2020-01-09 ENCOUNTER — OFFICE VISIT (OUTPATIENT)
Dept: OBGYN CLINIC | Facility: HOSPITAL | Age: 45
End: 2020-01-09
Payer: COMMERCIAL

## 2020-01-09 ENCOUNTER — HOSPITAL ENCOUNTER (OUTPATIENT)
Dept: RADIOLOGY | Facility: HOSPITAL | Age: 45
Discharge: HOME | End: 2020-01-09
Attending: ORTHOPAEDIC SURGERY
Payer: COMMERCIAL

## 2020-01-09 VITALS
SYSTOLIC BLOOD PRESSURE: 152 MMHG | HEIGHT: 71 IN | DIASTOLIC BLOOD PRESSURE: 103 MMHG | HEART RATE: 81 BPM | WEIGHT: 244 LBS | BODY MASS INDEX: 34.16 KG/M2

## 2020-01-09 DIAGNOSIS — M25.531 PAIN IN RIGHT WRIST: ICD-10-CM

## 2020-01-09 DIAGNOSIS — Z09 FRACTURE FOLLOW-UP: ICD-10-CM

## 2020-01-09 DIAGNOSIS — Z09 FRACTURE FOLLOW-UP: Primary | ICD-10-CM

## 2020-01-09 DIAGNOSIS — S66.911A WRIST STRAIN, RIGHT, INITIAL ENCOUNTER: ICD-10-CM

## 2020-01-09 PROCEDURE — 73080 X-RAY EXAM OF ELBOW: CPT

## 2020-01-09 PROCEDURE — 99213 OFFICE O/P EST LOW 20 MIN: CPT | Performed by: ORTHOPAEDIC SURGERY

## 2020-01-09 NOTE — PROGRESS NOTES
Assessment:  1  Fracture follow-up  XR elbow 3+ vw right    MRI wrist right wo contrast   2  Pain in right wrist  MRI wrist right wo contrast       Plan:  The patient has participated in physical therapy and used oral NSAIDs with no benefit  She continues to have tenderness over ulnar aspect of carpus with no evidence of fracture on imaging  A right wrist MRI is ordered  The patient should use otc ibuprofen as needed  The patient should follow up after MRI  To do next visit:  Return for after right wrist MRI  The above stated was discussed in layman's terms and the patient expressed understanding  All questions were answered to the patient's satisfaction  Scribe Attestation    I,:   Abby Horn am acting as a scribe while in the presence of the attending physician :        I,:   Gisselle Grove MD personally performed the services described in this documentation    as scribed in my presence :              Subjective:   Anabela Putnam is a 50 y o  female who presents for follow up of right wrist and elbow pain  The pain is resolving  Today she complains of circumferential wrist and ulnar distald forearma dn hand pain  She rates her pain at 2/10 and 10/10 at its worse  Squeezing/writting and then releasing, twisting aggravates  She does participate in physical therapy with benefit to the elbow  She did use Mobic with benefit  She denies tingling numbness in arms or neck pain  Review of systems negative unless otherwise specified in HPI    Past Medical History:   Diagnosis Date    Anxiety     Depression     Hyperlipidemia     Hypertension        History reviewed  No pertinent surgical history  History reviewed  No pertinent family history      Social History     Occupational History    Not on file   Tobacco Use    Smoking status: Never Smoker    Smokeless tobacco: Never Used   Substance and Sexual Activity    Alcohol use: Yes     Comment: rarely    Drug use: Never    Sexual activity: Not on file         Current Outpatient Medications:     ALPRAZolam (XANAX) 0 25 mg tablet, Take 0 25 mg by mouth daily, Disp: , Rfl:     amLODIPine (NORVASC) 5 mg tablet, Take 5 mg by mouth daily, Disp: , Rfl:     atorvastatin (LIPITOR) 80 mg tablet, Take 80 mg by mouth daily, Disp: , Rfl:     buPROPion (WELLBUTRIN XL) 300 mg 24 hr tablet, Take 300 mg by mouth daily, Disp: , Rfl:     carBAMazepine (CARBATROL) 300 MG 12 hr capsule, , Disp: , Rfl:     DULoxetine (CYMBALTA) 30 mg delayed release capsule, Take 90 mg by mouth daily, Disp: , Rfl:     DULoxetine (CYMBALTA) 60 mg delayed release capsule, , Disp: , Rfl:     meloxicam (MOBIC) 15 mg tablet, Take 1 tablet (15 mg total) by mouth daily, Disp: 30 tablet, Rfl: 0    norethindrone (MICRONOR) 0 35 MG tablet, , Disp: , Rfl: 4    Allergies   Allergen Reactions    Penicillins Hives    Rofecoxib GI Intolerance    Sulfa Antibiotics Rash    Sulfamethoxazole-Trimethoprim Rash            Vitals:    01/09/20 1526   BP: (!) 152/103   Pulse: 81       Objective:  Physical exam  · General: Awake, Alert, Oriented  · Eyes: Pupils equal, round and reactive to light  · Heart: regular rate and rhythm  · Lungs: No audible wheezing  · Abdomen: soft                    Ortho Exam   Right elbow:  Restriction with extension   Limited flexion  Limited supination, full pronation  Non-tender distal ulnar and radius  Tender over distal carpus     Diagnostics, reviewed and taken today if performed as documented: The attending physician has personally reviewed the pertinent films in PACS and interpretation is as follows:  Right radius x-ray:   Healed non-displaced radial head fracture  Procedures, if performed today:    Procedures    None performed      Portions of the record may have been created with voice recognition software    Occasional wrong word or "sound a like" substitutions may have occurred due to the inherent limitations of voice recognition software  Read the chart carefully and recognize, using context, where substitutions have occurred

## 2020-01-16 ENCOUNTER — OFFICE VISIT (OUTPATIENT)
Dept: PHYSICAL THERAPY | Facility: REHABILITATION | Age: 45
End: 2020-01-16
Payer: COMMERCIAL

## 2020-01-16 DIAGNOSIS — M25.531 PAIN IN RIGHT WRIST: ICD-10-CM

## 2020-01-16 DIAGNOSIS — S52.124D CLOSED NONDISPLACED FRACTURE OF HEAD OF RIGHT RADIUS WITH ROUTINE HEALING, SUBSEQUENT ENCOUNTER: Primary | ICD-10-CM

## 2020-01-16 PROCEDURE — 97110 THERAPEUTIC EXERCISES: CPT

## 2020-01-16 PROCEDURE — 97140 MANUAL THERAPY 1/> REGIONS: CPT

## 2020-01-16 NOTE — PROGRESS NOTES
Daily Note     Today's date: 2020  Patient name: Matilda Christy  : 1975  MRN: [id number]  Referring provider: Susanna Soria MD  Dx:   Encounter Diagnosis     ICD-10-CM    1  Closed nondisplaced fracture of head of right radius with routine healing, subsequent encounter S52 124D    2  Pain in right wrist M25 531        Start Time: 1730  Stop Time:   Total time in clinic (min): 55 minutes    Subjective: Pt says that her elbow is starting to getting better, but her wrist has been bothering her still  Pt states she had an XR of her elbow but did not get results yet  Pt states she has an MRI scheduled for  and will inform us on any results  Objective: See treatment diary below  Assessment: Pt tolerated today's session well with no adverse symptoms  Pt shows increased ROM post manuals  Pt will benefit from further skilled PT to increase strength, flexibility and function  Continue to progress as able  Plan: Continue per plan of care            Precautions: anxiety, depression, HLD, HTN    Daily Treatment Diary   Assessment         Pancho/Reval MD            FOTO     **     **   HEP Issued  Yes            Manuals         R wrist PROM  MD LORENZ HY HY        R elbow PROM  MD LORENZ HY HY        R forearm STM   DK HY HY        Radial head mob   Gr II MW MD np        TFCC glide   Gr II CAMILO CARMEN np        Exercise Diary          Elbow AROM:  Flex/Ext 5"x10 5"x20  20x5" 20x5"        Elbow AROM:  Pro/Sup 5"x10 5"x15  15x5" 15x5"        Wrist AROM:  Flex/Ext 5"x10 5"x20  20x5" 20x5"        Wrist AROM:  RD/UD  5"x20  20x5" 20x5"        Digiflex - Yellow  20 hold                       Wrist ISO 4 ways    10x5" np                                                                         Modalities         MHP R wrist & elbow  Pre-Tx NV 10' 10' post tx 10' pre 10' pre        CP R wrist & elbow  Post-Tx 10'  10' 10'          HEP: wrist AROM, elbow AROM (12/11/19)

## 2020-01-19 ENCOUNTER — HOSPITAL ENCOUNTER (OUTPATIENT)
Dept: RADIOLOGY | Facility: HOSPITAL | Age: 45
Discharge: HOME | End: 2020-01-19
Attending: ORTHOPAEDIC SURGERY
Payer: COMMERCIAL

## 2020-01-19 DIAGNOSIS — M25.531 PAIN IN RIGHT WRIST: ICD-10-CM

## 2020-01-19 DIAGNOSIS — Z09 FRACTURE FOLLOW-UP: ICD-10-CM

## 2020-01-19 PROCEDURE — 73221 MRI JOINT UPR EXTREM W/O DYE: CPT

## 2020-01-22 ENCOUNTER — OFFICE VISIT (OUTPATIENT)
Dept: PHYSICAL THERAPY | Facility: REHABILITATION | Age: 45
End: 2020-01-22
Payer: COMMERCIAL

## 2020-01-22 DIAGNOSIS — S52.124D CLOSED NONDISPLACED FRACTURE OF HEAD OF RIGHT RADIUS WITH ROUTINE HEALING, SUBSEQUENT ENCOUNTER: Primary | ICD-10-CM

## 2020-01-22 DIAGNOSIS — M25.531 PAIN IN RIGHT WRIST: ICD-10-CM

## 2020-01-22 PROCEDURE — 97140 MANUAL THERAPY 1/> REGIONS: CPT

## 2020-01-22 PROCEDURE — 97110 THERAPEUTIC EXERCISES: CPT

## 2020-01-22 NOTE — PROGRESS NOTES
Daily Note     Today's date: 2020  Patient name: Josiane Patel  : 1975  MRN: [id number]  Referring provider: Cristy Reynoso MD  Dx:   Encounter Diagnosis     ICD-10-CM    1  Closed nondisplaced fracture of head of right radius with routine healing, subsequent encounter S52 124D    2  Pain in right wrist M25 531        Start Time: 1700  Stop Time: 1755  Total time in clinic (min): 55 minutes    Subjective: Pt says that her elbow is starting to getting better, but her wrist has been bothering her still  Pt reports she had her MRI this weekend but has yet to hear back from the MD         Objective: See treatment diary below  Assessment: Pt tolerated today's session well with no adverse symptoms  Pt shows increased ROM post manuals  Pt performed new exercises as noted with no signs of increased pain or adverse symptoms  Pt will benefit from further skilled PT to increase strength, flexibility and function  Continue to progress as able  Plan: Continue per plan of care            Precautions: anxiety, depression, HLD, HTN    Daily Treatment Diary   Assessment        Pancho/Reval MD            FOTO     ** 40    **   HEP Issued  Yes            Manuals        R wrist PROM  MD DK HY HY HY       R elbow PROM  MD LORENZ HY HY HY       R forearm STM   DK HY HY HY       Radial head mob   Gr II MW MD np np       TFCC glide   Gr II CAMILO CARMEN np np       Exercise Diary         Elbow AROM:  Flex/Ext 5"x10 5"x20  20x5" 20x5" 1# 2x10       Elbow AROM:  Pro/Sup 5"x10 5"x15  15x5" 15x5" 20x5"       Wrist AROM:  Flex/Ext 5"x10 5"x20  20x5" 20x5" 20x5"       Wrist AROM:  RD/UD  5"x20  20x5" 20x5" 20x5"       Digiflex - Yellow  20 hold   To fatigue                    Wrist ISO 4 ways    10x5" np        Finger Web key turns       30x Red                                                            Modalities  1/16 1/22        MHP R wrist & elbow  Pre-Tx NV 10' 10' post tx 10' pre 10' pre 10' pre       CP R wrist & elbow  Post-Tx  10'  10' 10' np          HEP: wrist AROM, elbow AROM (12/11/19)

## 2020-01-29 ENCOUNTER — APPOINTMENT (OUTPATIENT)
Dept: PHYSICAL THERAPY | Facility: REHABILITATION | Age: 45
End: 2020-01-29
Payer: COMMERCIAL

## 2020-01-30 ENCOUNTER — APPOINTMENT (OUTPATIENT)
Dept: PHYSICAL THERAPY | Facility: REHABILITATION | Age: 45
End: 2020-01-30
Payer: COMMERCIAL

## 2020-02-17 RX ORDER — SIMVASTATIN 40 MG
TABLET ORAL DAILY
COMMUNITY
Start: 2011-10-11 | End: 2021-08-17 | Stop reason: ALTCHOICE

## 2020-02-17 RX ORDER — PAROXETINE 30 MG/1
TABLET, FILM COATED ORAL
COMMUNITY
Start: 2008-01-15 | End: 2021-08-17 | Stop reason: ALTCHOICE

## 2020-02-17 RX ORDER — LISINOPRIL 20 MG/1
TABLET ORAL
COMMUNITY
Start: 2010-04-16 | End: 2021-08-17 | Stop reason: ALTCHOICE

## 2020-02-17 RX ORDER — CHLORTHALIDONE 25 MG/1
25 TABLET ORAL DAILY
COMMUNITY
Start: 2020-02-11 | End: 2021-08-17 | Stop reason: ALTCHOICE

## 2020-02-17 RX ORDER — LEVOFLOXACIN 500 MG/1
TABLET, FILM COATED ORAL
COMMUNITY
Start: 2009-10-30 | End: 2021-08-17 | Stop reason: ALTCHOICE

## 2020-02-18 ENCOUNTER — OFFICE VISIT (OUTPATIENT)
Dept: OBGYN CLINIC | Facility: HOSPITAL | Age: 45
End: 2020-02-18
Payer: COMMERCIAL

## 2020-02-18 VITALS
DIASTOLIC BLOOD PRESSURE: 88 MMHG | WEIGHT: 239 LBS | HEART RATE: 101 BPM | HEIGHT: 71 IN | SYSTOLIC BLOOD PRESSURE: 149 MMHG | BODY MASS INDEX: 33.46 KG/M2

## 2020-02-18 DIAGNOSIS — M25.531 PAIN IN RIGHT WRIST: ICD-10-CM

## 2020-02-18 DIAGNOSIS — M25.531 ARTHRALGIA OF RIGHT WRIST: Primary | ICD-10-CM

## 2020-02-18 PROCEDURE — 20605 DRAIN/INJ JOINT/BURSA W/O US: CPT | Performed by: ORTHOPAEDIC SURGERY

## 2020-02-18 PROCEDURE — 99213 OFFICE O/P EST LOW 20 MIN: CPT | Performed by: ORTHOPAEDIC SURGERY

## 2020-02-18 RX ORDER — BETAMETHASONE SODIUM PHOSPHATE AND BETAMETHASONE ACETATE 3; 3 MG/ML; MG/ML
6 INJECTION, SUSPENSION INTRA-ARTICULAR; INTRALESIONAL; INTRAMUSCULAR; SOFT TISSUE
Status: COMPLETED | OUTPATIENT
Start: 2020-02-18 | End: 2020-02-18

## 2020-02-18 RX ORDER — LIDOCAINE HYDROCHLORIDE 10 MG/ML
1 INJECTION, SOLUTION INFILTRATION; PERINEURAL
Status: COMPLETED | OUTPATIENT
Start: 2020-02-18 | End: 2020-02-18

## 2020-02-18 RX ORDER — BUPIVACAINE HYDROCHLORIDE 2.5 MG/ML
1 INJECTION, SOLUTION INFILTRATION; PERINEURAL
Status: COMPLETED | OUTPATIENT
Start: 2020-02-18 | End: 2020-02-18

## 2020-02-18 RX ADMIN — BUPIVACAINE HYDROCHLORIDE 1 ML: 2.5 INJECTION, SOLUTION INFILTRATION; PERINEURAL at 16:40

## 2020-02-18 RX ADMIN — BETAMETHASONE SODIUM PHOSPHATE AND BETAMETHASONE ACETATE 6 MG: 3; 3 INJECTION, SUSPENSION INTRA-ARTICULAR; INTRALESIONAL; INTRAMUSCULAR; SOFT TISSUE at 16:40

## 2020-02-18 RX ADMIN — LIDOCAINE HYDROCHLORIDE 1 ML: 10 INJECTION, SOLUTION INFILTRATION; PERINEURAL at 16:40

## 2020-02-18 NOTE — PROGRESS NOTES
Assessment:  1  Arthralgia of right wrist     2  Pain in right wrist         Plan:  The patient was provided with a radiocarpal steroid injection  She should continue physical therapy as needed  She should follow up in 6 weeks  To do next visit:  Return in about 6 weeks (around 3/31/2020)  The above stated was discussed in layman's terms and the patient expressed understanding  All questions were answered to the patient's satisfaction  Scribe Attestation    I,:   Christiana Laughlin am acting as a scribe while in the presence of the attending physician :        I,:   Nigel Jung MD personally performed the services described in this documentation    as scribed in my presence :              Subjective:   Megan Correa is a 50 y o  female who presents for follow up of right wrist pain  Overall she is improving  Today she complains of dorsal and ulnar hand and wrist pain with pushing  She is now able to supinate wrist without pain  She continues with physical therapy  She had used mobic with benefit  She denies tingling,numbness or neck pain  Review of systems negative unless otherwise specified in HPI    Past Medical History:   Diagnosis Date    Anxiety     Depression     Hyperlipidemia     Hypertension        History reviewed  No pertinent surgical history  History reviewed  No pertinent family history      Social History     Occupational History    Not on file   Tobacco Use    Smoking status: Never Smoker    Smokeless tobacco: Never Used   Substance and Sexual Activity    Alcohol use: Yes     Comment: rarely    Drug use: Never    Sexual activity: Not on file         Current Outpatient Medications:     chlorthalidone 25 mg tablet, Take 25 mg by mouth daily, Disp: , Rfl:     levofloxacin (Levaquin) 500 mg tablet, Take 1 tablet by mouth daily X 10 days, Disp: , Rfl:     lisinopril (ZESTRIL) 20 mg tablet, Take 1 tablet by mouth daily, Disp: , Rfl:     PARoxetine (Paxil) 30 mg tablet, Take 1 tablet by mouth daily, Disp: , Rfl:     simvastatin (ZOCOR) 40 mg tablet, Take by mouth Daily, Disp: , Rfl:     ALPRAZolam (XANAX) 0 25 mg tablet, Take 0 25 mg by mouth daily, Disp: , Rfl:     amLODIPine (NORVASC) 5 mg tablet, Take 5 mg by mouth daily, Disp: , Rfl:     atorvastatin (LIPITOR) 80 mg tablet, Take 80 mg by mouth daily, Disp: , Rfl:     buPROPion (WELLBUTRIN XL) 300 mg 24 hr tablet, Take 300 mg by mouth daily, Disp: , Rfl:     carBAMazepine (CARBATROL) 300 MG 12 hr capsule, , Disp: , Rfl:     DULoxetine (CYMBALTA) 30 mg delayed release capsule, Take 90 mg by mouth daily, Disp: , Rfl:     DULoxetine (CYMBALTA) 60 mg delayed release capsule, , Disp: , Rfl:     meloxicam (MOBIC) 15 mg tablet, Take 1 tablet (15 mg total) by mouth daily, Disp: 30 tablet, Rfl: 0    norethindrone (MICRONOR) 0 35 MG tablet, , Disp: , Rfl: 4    Allergies   Allergen Reactions    Penicillins Hives    Rofecoxib GI Intolerance    Sulfa Antibiotics Rash    Sulfamethoxazole-Trimethoprim Rash            Vitals:    02/18/20 1617   BP: 149/88   Pulse: 101       Objective:  Physical exam  · General: Awake, Alert, Oriented  · Eyes: Pupils equal, round and reactive to light  · Heart: regular rate and rhythm  · Lungs: No audible wheezing  · Abdomen: soft                    Ortho Exam   Right upper extremity:  Full elbow ROM    Right Wrist:  Full supination and pronation  Full flexion and extension  Pain with extreme extension  TTP Extensor carpi ulnaris      Diagnostics, reviewed and taken today if performed as documented: The attending physician has personally reviewed the pertinent films in PACS and interpretation is as follows:  Right wrist MRI:  Mild degenerative changes  Otherwise unremarkable        Procedures, if performed today:    Medium joint arthrocentesis: R radiocarpal  Date/Time: 2/18/2020 4:40 PM  Consent given by: patient  Site marked: site marked  Timeout: Immediately prior to procedure a time out was called to verify the correct patient, procedure, equipment, support staff and site/side marked as required   Supporting Documentation  Indications: pain   Procedure Details  Location: wrist - R radiocarpal  Preparation: Patient was prepped and draped in the usual sterile fashion  Needle size: 22 G  Ultrasound guidance: no  Approach: dorsal  Medications administered: 1 mL bupivacaine 0 25 %; 1 mL lidocaine 1 %; 6 mg betamethasone acetate-betamethasone sodium phosphate 6 (3-3) mg/mL    Patient tolerance: patient tolerated the procedure well with no immediate complications  Dressing:  Sterile dressing applied            Portions of the record may have been created with voice recognition software  Occasional wrong word or "sound a like" substitutions may have occurred due to the inherent limitations of voice recognition software  Read the chart carefully and recognize, using context, where substitutions have occurred

## 2020-05-20 ENCOUNTER — HOSPITAL ENCOUNTER (OUTPATIENT)
Dept: RADIOLOGY | Facility: HOSPITAL | Age: 45
Discharge: HOME | End: 2020-05-20
Attending: ORTHOPAEDIC SURGERY
Payer: COMMERCIAL

## 2020-05-20 ENCOUNTER — OFFICE VISIT (OUTPATIENT)
Dept: OBGYN CLINIC | Facility: HOSPITAL | Age: 45
End: 2020-05-20
Payer: COMMERCIAL

## 2020-05-20 VITALS
HEIGHT: 71 IN | DIASTOLIC BLOOD PRESSURE: 93 MMHG | BODY MASS INDEX: 33.18 KG/M2 | SYSTOLIC BLOOD PRESSURE: 151 MMHG | HEART RATE: 91 BPM | WEIGHT: 237 LBS

## 2020-05-20 DIAGNOSIS — Z09 FRACTURE FOLLOW-UP: ICD-10-CM

## 2020-05-20 DIAGNOSIS — Z09 FRACTURE FOLLOW-UP: Primary | ICD-10-CM

## 2020-05-20 DIAGNOSIS — M77.01 MEDIAL EPICONDYLITIS OF RIGHT ELBOW: ICD-10-CM

## 2020-05-20 PROCEDURE — 73080 X-RAY EXAM OF ELBOW: CPT

## 2020-05-20 PROCEDURE — 99213 OFFICE O/P EST LOW 20 MIN: CPT | Performed by: ORTHOPAEDIC SURGERY

## 2020-05-20 PROCEDURE — 20605 DRAIN/INJ JOINT/BURSA W/O US: CPT | Performed by: ORTHOPAEDIC SURGERY

## 2020-05-20 RX ORDER — LIDOCAINE HYDROCHLORIDE 10 MG/ML
1 INJECTION, SOLUTION INFILTRATION; PERINEURAL
Status: COMPLETED | OUTPATIENT
Start: 2020-05-20 | End: 2020-05-20

## 2020-05-20 RX ORDER — BETAMETHASONE SODIUM PHOSPHATE AND BETAMETHASONE ACETATE 3; 3 MG/ML; MG/ML
6 INJECTION, SUSPENSION INTRA-ARTICULAR; INTRALESIONAL; INTRAMUSCULAR; SOFT TISSUE
Status: COMPLETED | OUTPATIENT
Start: 2020-05-20 | End: 2020-05-20

## 2020-05-20 RX ORDER — BUPIVACAINE HYDROCHLORIDE 2.5 MG/ML
1 INJECTION, SOLUTION INFILTRATION; PERINEURAL
Status: COMPLETED | OUTPATIENT
Start: 2020-05-20 | End: 2020-05-20

## 2020-05-20 RX ADMIN — LIDOCAINE HYDROCHLORIDE 1 ML: 10 INJECTION, SOLUTION INFILTRATION; PERINEURAL at 16:15

## 2020-05-20 RX ADMIN — BETAMETHASONE SODIUM PHOSPHATE AND BETAMETHASONE ACETATE 6 MG: 3; 3 INJECTION, SUSPENSION INTRA-ARTICULAR; INTRALESIONAL; INTRAMUSCULAR; SOFT TISSUE at 16:15

## 2020-05-20 RX ADMIN — BUPIVACAINE HYDROCHLORIDE 1 ML: 2.5 INJECTION, SOLUTION INFILTRATION; PERINEURAL at 16:15

## 2020-07-27 ENCOUNTER — TRANSCRIBE ORDERS (OUTPATIENT)
Dept: ADMINISTRATIVE | Facility: HOSPITAL | Age: 45
End: 2020-07-27

## 2020-07-27 DIAGNOSIS — Z12.31 OTHER SCREENING MAMMOGRAM: Primary | ICD-10-CM

## 2020-10-01 ENCOUNTER — HOSPITAL ENCOUNTER (OUTPATIENT)
Dept: MAMMOGRAPHY | Facility: MEDICAL CENTER | Age: 45
Discharge: HOME | End: 2020-10-01
Payer: COMMERCIAL

## 2020-10-01 VITALS — WEIGHT: 211 LBS | HEIGHT: 71 IN | BODY MASS INDEX: 29.54 KG/M2

## 2020-10-01 DIAGNOSIS — Z12.31 OTHER SCREENING MAMMOGRAM: ICD-10-CM

## 2020-10-01 PROCEDURE — 77063 BREAST TOMOSYNTHESIS BI: CPT

## 2020-10-01 PROCEDURE — 77067 SCR MAMMO BI INCL CAD: CPT

## 2020-10-16 ENCOUNTER — TRANSCRIBE ORDERS (OUTPATIENT)
Dept: ADMINISTRATIVE | Facility: HOSPITAL | Age: 45
End: 2020-10-16

## 2020-10-16 ENCOUNTER — LAB (OUTPATIENT)
Dept: LAB | Age: 45
End: 2020-10-16
Payer: COMMERCIAL

## 2020-10-16 DIAGNOSIS — Z79.899 ENCOUNTER FOR LONG-TERM (CURRENT) USE OF OTHER MEDICATIONS: Primary | ICD-10-CM

## 2020-10-16 DIAGNOSIS — Z79.899 ENCOUNTER FOR LONG-TERM (CURRENT) USE OF OTHER MEDICATIONS: ICD-10-CM

## 2020-10-16 LAB
CARBAMAZEPINE SERPL-MCNC: 9.1 UG/ML (ref 4–12)
CHOLEST SERPL-MCNC: 192 MG/DL (ref 50–200)
HDLC SERPL-MCNC: 78 MG/DL
LDLC SERPL CALC-MCNC: 83 MG/DL (ref 0–100)
NONHDLC SERPL-MCNC: 114 MG/DL
TRIGL SERPL-MCNC: 156 MG/DL

## 2020-10-16 PROCEDURE — 80156 ASSAY CARBAMAZEPINE TOTAL: CPT

## 2020-10-16 PROCEDURE — 36415 COLL VENOUS BLD VENIPUNCTURE: CPT

## 2020-10-16 PROCEDURE — 80061 LIPID PANEL: CPT

## 2020-11-18 ENCOUNTER — OFFICE VISIT (OUTPATIENT)
Dept: URGENT CARE | Age: 45
End: 2020-11-18
Payer: COMMERCIAL

## 2020-11-18 VITALS
RESPIRATION RATE: 18 BRPM | WEIGHT: 220 LBS | OXYGEN SATURATION: 98 % | BODY MASS INDEX: 30.8 KG/M2 | TEMPERATURE: 98.6 F | HEIGHT: 71 IN | HEART RATE: 86 BPM

## 2020-11-18 DIAGNOSIS — Z11.59 SPECIAL SCREENING EXAMINATION FOR UNSPECIFIED VIRAL DISEASE: Primary | ICD-10-CM

## 2020-11-18 PROCEDURE — U0003 INFECTIOUS AGENT DETECTION BY NUCLEIC ACID (DNA OR RNA); SEVERE ACUTE RESPIRATORY SYNDROME CORONAVIRUS 2 (SARS-COV-2) (CORONAVIRUS DISEASE [COVID-19]), AMPLIFIED PROBE TECHNIQUE, MAKING USE OF HIGH THROUGHPUT TECHNOLOGIES AS DESCRIBED BY CMS-2020-01-R: HCPCS | Performed by: PHYSICIAN ASSISTANT

## 2020-11-18 PROCEDURE — G0382 LEV 3 HOSP TYPE B ED VISIT: HCPCS | Performed by: PHYSICIAN ASSISTANT

## 2020-11-19 LAB — SARS-COV-2 RNA SPEC QL NAA+PROBE: NOT DETECTED

## 2021-01-07 ENCOUNTER — IMMUNIZATIONS (OUTPATIENT)
Dept: FAMILY MEDICINE CLINIC | Facility: HOSPITAL | Age: 46
End: 2021-01-07

## 2021-01-07 DIAGNOSIS — Z23 ENCOUNTER FOR IMMUNIZATION: ICD-10-CM

## 2021-01-07 PROCEDURE — 0011A SARS-COV-2 / COVID-19 MRNA VACCINE (MODERNA) 100 MCG: CPT

## 2021-01-07 PROCEDURE — 91301 SARS-COV-2 / COVID-19 MRNA VACCINE (MODERNA) 100 MCG: CPT

## 2021-02-04 ENCOUNTER — IMMUNIZATIONS (OUTPATIENT)
Dept: FAMILY MEDICINE CLINIC | Facility: HOSPITAL | Age: 46
End: 2021-02-04

## 2021-02-04 DIAGNOSIS — Z23 ENCOUNTER FOR IMMUNIZATION: Primary | ICD-10-CM

## 2021-02-04 PROCEDURE — 0012A SARS-COV-2 / COVID-19 MRNA VACCINE (MODERNA) 100 MCG: CPT

## 2021-02-04 PROCEDURE — 91301 SARS-COV-2 / COVID-19 MRNA VACCINE (MODERNA) 100 MCG: CPT

## 2021-03-02 NOTE — PROGRESS NOTES
Weight Management Medical Nutrition Assessment  Bud Alvarez is here for initial RD session for [facility] (bypass MD)  Barbara completed & results reviewed  Current wt: 230 3 lbs, with patient goal weight of 185 lbs  Patient has tried weight loss programs in the past that worked well, but recently has been struggling with weight due to emotional distress  Overall, she has a good understanding of nutrition, but has trouble with snacking after lunch and dinner  Her protein and water intake are also lacking  Provided meal recommendations and meal replacement suggestions  Bud Alvarez will incorporate 1 bar as a snack after breakfast and a meal replacement during lunch  Provided her with a meal plan outlining this information in more detail  Will continue to monitor her progress and follow up in 2 weeks       Patient seen by Medical Provider in past 6 months:  no  Requested to schedule appointment with Medical Provider: Yes    Anthropometric Measurements  Start Weight (#): 230 3 lbs 3/3/21  Ideal Body Weight (#): 155 lbs  Goal Weight (#): 185 lbs    Weight Loss History  Previous weight loss attempts: [facility] and [employer]    Food and Nutrition Related History  Wake up: 5am   Bed Time: 8:30pm    Food Recall  Breakfast: 5am 3 cups 12 oz coffee with 1/4 cup skim milk; 8am [employer] low-fat yogurt, fruit, with dried whole oats  Snack: 7:30-11am protein bar with a piece of fruit  Lunch: 1pm mixed salad from [facility] with carrots, cucumbers, tomatoes, 1/4 cup low-fat feta cheese, and balsamic vinaigrette (not measured), or a peanut butter and jelly sandwich with natural peanut butter and a low-calorie bread, with fruit or yogurt or hummus with cucumbers   Snack: 3:30pm Entire packet of dried Ramen noodles, tablespoon of peanut butter, chocolate milk    Dinner: 6pm Grilled cheese, soups (potato, chicken noodle, creamy or broth based), 3oz chicken,1 cup steamed broccoli, with more than one serving of basmati rice, cheeseburger on white roll with lettuce, onion, tomato or ketchup   Snack: 8pm something crunchy- dried ramen noodles     Beverages: water, skim milk and coffee/tea  Volume of beverage intake: 20 oz lime water, 8oz can lime seltzer water, 3 12 oz cups coffee coffee, and 12 oz skim milk     Weekends: Worse, Being cold outside and stuck in apartment  Cravings: crunchy  Trouble area of day: after lunch and dinner gravitates towards snacks     Frequency of Eating out: twice a week   Food restrictions: No  Cooking: self   Food Shopping: self    Physical Activity Intake  Activity: No  Frequency:never- likes to do yard work in the summer   Physical limitations/barriers to exercise: [place]imeon, busy with work     Estimated Needs  Energy  SECA: BMR: 1694      X 1 3 -1000 =1202  [employer] [place] Energy Needs: BMR : 1796    1-2# loss weekly sedentary:  5604-4255             1-2# loss weekly lightly active: 4212-2585  Maintenance calories for sedentary activity level: 2125  Protein:  gm    (1 2-1 5g/kg IBW)  Fluid: 82 oz     (35mL/kg IBW)    Nutrition Diagnosis  Yes; Overweight/obesity  related to Excess energy intake as evidenced by  BMI more than normative standard for age and sex (obesity-grade I 26-30  9)       Nutrition Intervention    Nutrition Prescription  Calories: 0097-3061  Protein: 97-107g    Meal Plan (Camilo/Pro)  Breakfast 1: 130 calories, 12 grams protein  Breakfast 2: ~250 calories, 12 grams protein  Snack: 160 calories, 15 grams protein   Lunch: 260-300 calories, 27 grams protein  Snack: ~200 calories, 5-10 grams protein  Dinner: 375-450 calories, 21 grams protein   Snack: 0-150 calories, 5-10 grams protein    Nutrition Education:    [facility] Manual  Calorie controlled menu  Lean protein food choices  Healthy snack options  Food journaling tips    Nutrition Counseling:  Strategies: meal planning, portion sizes, healthy snack choices, hydration, fiber intake, protein intake, exercise, food journal    Monitoring and Evaluation:  Evaluation criteria:  Energy Intake  Meet protein needs  Maintain adequate hydration  Monitor weekly weight  Meal planning/preparation  Food journal   Decreased portions at mealtimes and snacks  Physical activity     Barriers to learning:emotional  Readiness to change: Preparation:  (Getting ready to change)   Comprehension: very good  Expected Compliance: good

## 2021-03-03 ENCOUNTER — OFFICE VISIT (OUTPATIENT)
Dept: BARIATRICS | Facility: CLINIC | Age: 46
End: 2021-03-03

## 2021-03-03 VITALS — WEIGHT: 230.27 LBS | HEIGHT: 71 IN | BODY MASS INDEX: 32.24 KG/M2

## 2021-03-03 DIAGNOSIS — R63.5 ABNORMAL WEIGHT GAIN: ICD-10-CM

## 2021-03-03 PROCEDURE — WMPFE WEIGHT MANAGEMENT PRO FEE EMPLOYEE

## 2021-03-03 PROCEDURE — RECHECK

## 2021-03-10 ENCOUNTER — TRANSCRIBE ORDERS (OUTPATIENT)
Dept: URGENT CARE | Age: 46
End: 2021-03-10

## 2021-03-10 ENCOUNTER — APPOINTMENT (OUTPATIENT)
Dept: LAB | Age: 46
End: 2021-03-10
Payer: COMMERCIAL

## 2021-03-10 DIAGNOSIS — Z79.899 ENCOUNTER FOR LONG-TERM (CURRENT) USE OF OTHER MEDICATIONS: ICD-10-CM

## 2021-03-10 DIAGNOSIS — Z79.899 ENCOUNTER FOR LONG-TERM (CURRENT) USE OF OTHER MEDICATIONS: Primary | ICD-10-CM

## 2021-03-10 LAB — CARBAMAZEPINE SERPL-MCNC: 7.6 UG/ML (ref 4–12)

## 2021-03-10 PROCEDURE — 36415 COLL VENOUS BLD VENIPUNCTURE: CPT

## 2021-03-10 PROCEDURE — 80156 ASSAY CARBAMAZEPINE TOTAL: CPT

## 2021-03-11 ENCOUNTER — CLINICAL SUPPORT (OUTPATIENT)
Dept: BARIATRICS | Facility: CLINIC | Age: 46
End: 2021-03-11

## 2021-03-11 DIAGNOSIS — R63.5 ABNORMAL WEIGHT GAIN: Primary | ICD-10-CM

## 2021-03-11 PROCEDURE — RECHECK

## 2021-03-12 VITALS — BODY MASS INDEX: 32.12 KG/M2 | HEIGHT: 71 IN

## 2021-03-17 ENCOUNTER — TELEPHONE (OUTPATIENT)
Dept: OTHER | Facility: OTHER | Age: 46
End: 2021-03-17

## 2021-03-17 NOTE — TELEPHONE ENCOUNTER
C [x] 3/17/21 11:00 AM [address] [[id number]] [facility]     Patient will call back to reschedule  Not feeling good today

## 2021-03-18 ENCOUNTER — CLINICAL SUPPORT (OUTPATIENT)
Dept: BARIATRICS | Facility: CLINIC | Age: 46
End: 2021-03-18

## 2021-03-18 VITALS — BODY MASS INDEX: 32.12 KG/M2 | HEIGHT: 71 IN

## 2021-03-18 DIAGNOSIS — R63.5 ABNORMAL WEIGHT GAIN: Primary | ICD-10-CM

## 2021-03-18 PROCEDURE — RECHECK

## 2021-03-24 ENCOUNTER — OFFICE VISIT (OUTPATIENT)
Dept: BARIATRICS | Facility: CLINIC | Age: 46
End: 2021-03-24

## 2021-03-24 DIAGNOSIS — E66.9 OBESITY, CLASS I, BMI 30-34.9: Primary | ICD-10-CM

## 2021-03-24 PROCEDURE — RECHECK
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
## 2021-04-01 ENCOUNTER — CLINICAL SUPPORT (OUTPATIENT)
Dept: BARIATRICS | Facility: CLINIC | Age: 46
End: 2021-04-01

## 2021-04-01 VITALS — HEIGHT: 71 IN | BODY MASS INDEX: 32.12 KG/M2

## 2021-04-01 DIAGNOSIS — R63.5 ABNORMAL WEIGHT GAIN: Primary | ICD-10-CM

## 2021-04-01 PROCEDURE — RECHECK

## 2021-04-05 NOTE — PROGRESS NOTES
Weight Management Medical Nutrition Assessment  Delmy is here for f/u RD session for Healthy Core (bypass MD)  Current wt: 228 2 lbs, loss of 2 1 lb x 1 month (3 2 lbs x 2 wk)  Reports not following plan  Feeling overwhelmed  Reviewed what she is currently doing and she is doing great! Positive reassurance provided  Does feel that she is starting to feel better mentally  Recently purchased new sneakers and looking forward to using them  Finds the meal replacement and bar to be helpful  Questions answered: meal delivery service  Suggestions made for complimentary proteins       Patient seen by Medical Provider in past 6 months:  no  Requested to schedule appointment with Medical Provider: No    Anthropometric Measurements  Start Weight (#): 230 3 lbs 3/3/21  Current wt: 228 2 lbs  Ideal Body Weight (#): 155 lbs  Goal Weight (#): 185 lbs    Weight Loss History  Previous weight loss attempts: [facility] and [facility]    Food and Nutrition Related History  Wake up: 5am   Bed Time: 8:30pm    Food Recall  Breakfast: 5am 3 cups 12 oz coffee with 12 oz skim milk; 8am cottage cheese, fruit OR [employer] low-fat yogurt, fruit, with dried whole oats  Snack: 1030 am protein bar with a piece of fruit  Lunch: 1pm mixed salad from [employer] with carrots, cucumbers, tomatoes, cut up chicken or if not then cottage cheese, sometimes shredded cheese, and balsamic vinaigrette (not measured)   Snack: 3:30pm meal replacement OR greek yogurt, fruit   Dinner: 6pm: mixed veggies, ~1/2 cup beans, fruit    Snack: 8pm skip     Beverages: water, skim milk and coffee/tea  Volume of beverage intake: 20 oz lime water, 8oz can lime seltzer water, 3 12 oz cups coffee coffee, and 12 oz skim milk     Weekends: Worse, Being cold outside and stuck in apartment  Cravings: crunchy  Trouble area of day: after lunch and dinner gravitates towards snacks     Frequency of Eating out: twice a week   Food restrictions: No  Cooking: self   Food Shopping: self    Physical Activity Intake  Activity: No  Frequency:never- likes to do yard work in the summer   Physical limitations/barriers to exercise: Cold weather, busy with work     Estimated Needs  Energy  SECA: BMR: 1694      X 1 3 -1000 =1202  [employer] [place] [employer] Needs: BMR : 2264    1-2# loss weekly sedentary:  9062-4973             1-2# loss weekly lightly active: 6262-1120  Maintenance calories for sedentary activity level: 2125  Protein:  gm    (1 2-1 5g/kg IBW)  Fluid: 82 oz     (35mL/kg IBW)    Nutrition Diagnosis  Yes; Overweight/obesity  related to Excess energy intake as evidenced by  BMI more than normative standard for age and sex (obesity-grade I 26-30  9)       Nutrition Intervention    Nutrition Prescription  Calories: 7115-5591  Protein: 97-107g    Meal Plan (Camilo/Pro)  Breakfast 1: 130 calories, 12 grams protein  Breakfast 2: ~250 calories, 12 grams protein  Snack: 160 calories, 15 grams protein   Lunch: 260-300 calories, 27 grams protein  Snack: ~200 calories, 5-10 grams protein  Dinner: 375-450 calories, 21 grams protein   Snack: 0-150 calories, 5-10 grams protein    Nutrition Education:    Healthy Core Manual  Calorie controlled menu  Lean protein food choices  Healthy snack options  Food journaling tips    Nutrition Counseling:  Strategies: meal planning, portion sizes, healthy snack choices, hydration, fiber intake, protein intake, exercise, food journal    Monitoring and Evaluation:  Evaluation criteria:  Energy Intake  Meet protein needs  Maintain adequate hydration  Monitor weekly weight  Meal planning/preparation  Food journal   Decreased portions at mealtimes and snacks  Physical activity     Barriers to learning:emotional  Readiness to change: Action:  (Changing behavior)  Comprehension: very good  Expected Compliance: good

## 2021-04-07 ENCOUNTER — OFFICE VISIT (OUTPATIENT)
Dept: BARIATRICS | Facility: CLINIC | Age: 46
End: 2021-04-07

## 2021-04-07 VITALS — BODY MASS INDEX: 31.95 KG/M2 | HEIGHT: 71 IN | WEIGHT: 228.2 LBS

## 2021-04-07 DIAGNOSIS — R63.5 ABNORMAL WEIGHT GAIN: Primary | ICD-10-CM

## 2021-04-07 PROCEDURE — RECHECK

## 2021-04-08 ENCOUNTER — CLINICAL SUPPORT (OUTPATIENT)
Dept: BARIATRICS | Facility: CLINIC | Age: 46
End: 2021-04-08

## 2021-04-08 VITALS — HEIGHT: 71 IN | BODY MASS INDEX: 31.83 KG/M2

## 2021-04-08 DIAGNOSIS — R63.5 ABNORMAL WEIGHT GAIN: Primary | ICD-10-CM

## 2021-04-08 PROCEDURE — RECHECK

## 2021-04-15 ENCOUNTER — CLINICAL SUPPORT (OUTPATIENT)
Dept: BARIATRICS | Facility: CLINIC | Age: 46
End: 2021-04-15

## 2021-04-15 VITALS — BODY MASS INDEX: 31.83 KG/M2 | HEIGHT: 71 IN

## 2021-04-15 DIAGNOSIS — R63.5 ABNORMAL WEIGHT GAIN: Primary | ICD-10-CM

## 2021-04-15 PROCEDURE — RECHECK

## 2021-05-19 ENCOUNTER — APPOINTMENT (OUTPATIENT)
Dept: LAB | Age: 46
End: 2021-05-19

## 2021-05-19 ENCOUNTER — TRANSCRIBE ORDERS (OUTPATIENT)
Dept: URGENT CARE | Age: 46
End: 2021-05-19

## 2021-05-19 DIAGNOSIS — Z00.8 HEALTH EXAMINATION IN POPULATION SURVEYS: ICD-10-CM

## 2021-05-19 DIAGNOSIS — Z00.8 HEALTH EXAMINATION IN POPULATION SURVEYS: Primary | ICD-10-CM

## 2021-05-19 LAB
CHOLEST SERPL-MCNC: 218 MG/DL (ref 50–200)
EST. AVERAGE GLUCOSE BLD GHB EST-MCNC: 105 MG/DL
HBA1C MFR BLD: 5.3 %
HDLC SERPL-MCNC: 74 MG/DL
LDLC SERPL CALC-MCNC: 90 MG/DL (ref 0–100)
NONHDLC SERPL-MCNC: 144 MG/DL
TRIGL SERPL-MCNC: 269 MG/DL

## 2021-05-19 PROCEDURE — 83036 HEMOGLOBIN GLYCOSYLATED A1C: CPT

## 2021-05-19 PROCEDURE — 36415 COLL VENOUS BLD VENIPUNCTURE: CPT

## 2021-05-19 PROCEDURE — 80061 LIPID PANEL: CPT

## 2021-08-17 RX ORDER — LANOLIN ALCOHOL/MO/W.PET/CERES
CREAM (GRAM) TOPICAL DAILY
COMMUNITY

## 2021-08-17 RX ORDER — MELATONIN
2000 DAILY
COMMUNITY

## 2021-08-17 NOTE — PRE-PROCEDURE INSTRUCTIONS
Pre-Surgery Instructions:   Medication Instructions    ALPRAZolam (XANAX) 0 25 mg tablet Instructed patient per Anesthesia Guidelines   amLODIPine (NORVASC) 5 mg tablet Instructed patient per Anesthesia Guidelines   atorvastatin (LIPITOR) 80 mg tablet Instructed patient per Anesthesia Guidelines   b complex vitamins tablet Instructed patient per Anesthesia Guidelines   buPROPion (WELLBUTRIN XL) 300 mg 24 hr tablet Instructed patient per Anesthesia Guidelines   carBAMazepine (CARBATROL) 300 MG 12 hr capsule Instructed patient per Anesthesia Guidelines   cholecalciferol (VITAMIN D3) 1,000 units tablet Instructed patient per Anesthesia Guidelines   DULoxetine (CYMBALTA) 30 mg delayed release capsule Instructed patient per Anesthesia Guidelines   DULoxetine (CYMBALTA) 60 mg delayed release capsule Instructed patient per Anesthesia Guidelines   vitamin B-12 (VITAMIN B-12) 1,000 mcg tablet Instructed patient per Anesthesia Guidelines       Pt verbalizes understanding of the following:  - Last dose of OTC Vit was 8/17  - Avoid ASA/ NSAIDs  - NPO after MN but may take morning meds with a sip of water  - has bathing instructions

## 2021-08-18 ENCOUNTER — ANESTHESIA EVENT (OUTPATIENT)
Dept: PERIOP | Facility: HOSPITAL | Age: 46
End: 2021-08-18
Payer: COMMERCIAL

## 2021-08-18 PROCEDURE — NC001 PR NO CHARGE: Performed by: OBSTETRICS & GYNECOLOGY

## 2021-08-19 ENCOUNTER — HOSPITAL ENCOUNTER (OUTPATIENT)
Facility: HOSPITAL | Age: 46
Setting detail: OUTPATIENT SURGERY
Discharge: HOME | End: 2021-08-19
Attending: OBSTETRICS & GYNECOLOGY | Admitting: OBSTETRICS & GYNECOLOGY
Payer: COMMERCIAL

## 2021-08-19 ENCOUNTER — ANESTHESIA (OUTPATIENT)
Dept: PERIOP | Facility: HOSPITAL | Age: 46
End: 2021-08-19
Payer: COMMERCIAL

## 2021-08-19 VITALS
BODY MASS INDEX: 30.66 KG/M2 | WEIGHT: 219 LBS | DIASTOLIC BLOOD PRESSURE: 74 MMHG | HEART RATE: 71 BPM | HEIGHT: 71 IN | TEMPERATURE: 97.1 F | OXYGEN SATURATION: 100 % | RESPIRATION RATE: 12 BRPM | SYSTOLIC BLOOD PRESSURE: 148 MMHG

## 2021-08-19 DIAGNOSIS — N92.0 EXCESSIVE AND FREQUENT MENSTRUATION WITH REGULAR CYCLE: ICD-10-CM

## 2021-08-19 DIAGNOSIS — D25.9 LEIOMYOMA OF UTERUS, UNSPECIFIED: ICD-10-CM

## 2021-08-19 PROBLEM — Z98.890 STATUS POST ENDOMETRIAL ABLATION: Status: ACTIVE | Noted: 2021-08-19

## 2021-08-19 PROBLEM — F32.A ANXIETY AND DEPRESSION: Status: ACTIVE | Noted: 2021-08-19

## 2021-08-19 PROBLEM — Z98.890 STATUS POST HYSTEROSCOPY: Status: ACTIVE | Noted: 2021-08-19

## 2021-08-19 PROBLEM — F41.9 ANXIETY AND DEPRESSION: Status: ACTIVE | Noted: 2021-08-19

## 2021-08-19 PROBLEM — I10 HTN (HYPERTENSION): Status: ACTIVE | Noted: 2021-08-19

## 2021-08-19 PROBLEM — E78.5 HLD (HYPERLIPIDEMIA): Status: ACTIVE | Noted: 2021-08-19

## 2021-08-19 LAB
EXT PREGNANCY TEST URINE: NEGATIVE
EXT. CONTROL: NORMAL

## 2021-08-19 PROCEDURE — 88305 TISSUE EXAM BY PATHOLOGIST: CPT | Performed by: PATHOLOGY

## 2021-08-19 PROCEDURE — NC001 PR NO CHARGE: Performed by: OBSTETRICS & GYNECOLOGY

## 2021-08-19 PROCEDURE — 81025 URINE PREGNANCY TEST: CPT | Performed by: OBSTETRICS & GYNECOLOGY

## 2021-08-19 RX ORDER — PROPOFOL 10 MG/ML
INJECTION, EMULSION INTRAVENOUS AS NEEDED
Status: DISCONTINUED | OUTPATIENT
Start: 2021-08-19 | End: 2021-08-19

## 2021-08-19 RX ORDER — MIDAZOLAM HYDROCHLORIDE 2 MG/2ML
INJECTION, SOLUTION INTRAMUSCULAR; INTRAVENOUS AS NEEDED
Status: DISCONTINUED | OUTPATIENT
Start: 2021-08-19 | End: 2021-08-19

## 2021-08-19 RX ORDER — IBUPROFEN 600 MG/1
600 TABLET ORAL EVERY 6 HOURS PRN
Status: DISCONTINUED | OUTPATIENT
Start: 2021-08-19 | End: 2021-08-19 | Stop reason: HOSPADM

## 2021-08-19 RX ORDER — ACETAMINOPHEN 325 MG/1
650 TABLET ORAL EVERY 6 HOURS PRN
Status: DISCONTINUED | OUTPATIENT
Start: 2021-08-19 | End: 2021-08-19 | Stop reason: HOSPADM

## 2021-08-19 RX ORDER — ONDANSETRON 2 MG/ML
4 INJECTION INTRAMUSCULAR; INTRAVENOUS EVERY 6 HOURS PRN
Status: DISCONTINUED | OUTPATIENT
Start: 2021-08-19 | End: 2021-08-19 | Stop reason: HOSPADM

## 2021-08-19 RX ORDER — ONDANSETRON 2 MG/ML
4 INJECTION INTRAMUSCULAR; INTRAVENOUS ONCE AS NEEDED
Status: DISCONTINUED | OUTPATIENT
Start: 2021-08-19 | End: 2021-08-19 | Stop reason: HOSPADM

## 2021-08-19 RX ORDER — ONDANSETRON 2 MG/ML
INJECTION INTRAMUSCULAR; INTRAVENOUS AS NEEDED
Status: DISCONTINUED | OUTPATIENT
Start: 2021-08-19 | End: 2021-08-19

## 2021-08-19 RX ORDER — SODIUM CHLORIDE 9 MG/ML
125 INJECTION, SOLUTION INTRAVENOUS CONTINUOUS
Status: DISCONTINUED | OUTPATIENT
Start: 2021-08-19 | End: 2021-08-19

## 2021-08-19 RX ORDER — FENTANYL CITRATE 50 UG/ML
INJECTION, SOLUTION INTRAMUSCULAR; INTRAVENOUS AS NEEDED
Status: DISCONTINUED | OUTPATIENT
Start: 2021-08-19 | End: 2021-08-19

## 2021-08-19 RX ORDER — FENTANYL CITRATE/PF 50 MCG/ML
25 SYRINGE (ML) INJECTION
Status: DISCONTINUED | OUTPATIENT
Start: 2021-08-19 | End: 2021-08-19 | Stop reason: HOSPADM

## 2021-08-19 RX ORDER — KETOROLAC TROMETHAMINE 30 MG/ML
INJECTION, SOLUTION INTRAMUSCULAR; INTRAVENOUS AS NEEDED
Status: DISCONTINUED | OUTPATIENT
Start: 2021-08-19 | End: 2021-08-19

## 2021-08-19 RX ORDER — LOSARTAN POTASSIUM 25 MG/1
25 TABLET ORAL DAILY
COMMUNITY

## 2021-08-19 RX ORDER — SODIUM CHLORIDE 9 MG/ML
INJECTION, SOLUTION INTRAVENOUS AS NEEDED
Status: DISCONTINUED | OUTPATIENT
Start: 2021-08-19 | End: 2021-08-19 | Stop reason: HOSPADM

## 2021-08-19 RX ORDER — DEXAMETHASONE SODIUM PHOSPHATE 4 MG/ML
INJECTION, SOLUTION INTRA-ARTICULAR; INTRALESIONAL; INTRAMUSCULAR; INTRAVENOUS; SOFT TISSUE AS NEEDED
Status: DISCONTINUED | OUTPATIENT
Start: 2021-08-19 | End: 2021-08-19

## 2021-08-19 RX ADMIN — FENTANYL CITRATE 50 MCG: 50 INJECTION INTRAMUSCULAR; INTRAVENOUS at 09:31

## 2021-08-19 RX ADMIN — DEXAMETHASONE SODIUM PHOSPHATE 4 MG: 4 INJECTION INTRA-ARTICULAR; INTRALESIONAL; INTRAMUSCULAR; INTRAVENOUS; SOFT TISSUE at 09:31

## 2021-08-19 RX ADMIN — SODIUM CHLORIDE: 0.9 INJECTION, SOLUTION INTRAVENOUS at 10:06

## 2021-08-19 RX ADMIN — IBUPROFEN 600 MG: 600 TABLET, FILM COATED ORAL at 11:49

## 2021-08-19 RX ADMIN — FENTANYL CITRATE 25 MCG: 50 INJECTION INTRAMUSCULAR; INTRAVENOUS at 11:10

## 2021-08-19 RX ADMIN — SODIUM CHLORIDE 125 ML/HR: 0.9 INJECTION, SOLUTION INTRAVENOUS at 08:14

## 2021-08-19 RX ADMIN — FENTANYL CITRATE 25 MCG: 50 INJECTION INTRAMUSCULAR; INTRAVENOUS at 10:54

## 2021-08-19 RX ADMIN — PROPOFOL 200 MG: 10 INJECTION, EMULSION INTRAVENOUS at 09:31

## 2021-08-19 RX ADMIN — FENTANYL CITRATE 25 MCG: 50 INJECTION INTRAMUSCULAR; INTRAVENOUS at 10:16

## 2021-08-19 RX ADMIN — FENTANYL CITRATE 25 MCG: 50 INJECTION INTRAMUSCULAR; INTRAVENOUS at 10:14

## 2021-08-19 RX ADMIN — KETOROLAC TROMETHAMINE 30 MG: 30 INJECTION, SOLUTION INTRAMUSCULAR at 10:16

## 2021-08-19 RX ADMIN — ONDANSETRON 4 MG: 2 INJECTION INTRAMUSCULAR; INTRAVENOUS at 09:31

## 2021-08-19 RX ADMIN — MIDAZOLAM 2 MG: 1 INJECTION INTRAMUSCULAR; INTRAVENOUS at 09:23

## 2021-08-19 NOTE — ANESTHESIA PREPROCEDURE EVALUATION
Procedure:  D&C WITH DIAGNOSTIC HYSTEROSCOPY (N/A Uterus)  ABLATION ENDOMETRIAL RONAK (N/A Uterus)    Relevant Problems   CARDIO   (+) HTN (hypertension)      MUSCULOSKELETAL   (+) Medial epicondylitis of right elbow   (+) Wrist strain, right, initial encounter      NEURO/PSYCH   (+) Anxiety and depression        Physical Exam    Airway    Mallampati score: II  TM Distance: >3 FB  Neck ROM: full     Dental   No notable dental hx     Cardiovascular  Rhythm: regular, Rate: normal, Cardiovascular exam normal    Pulmonary  Pulmonary exam normal Breath sounds clear to auscultation,     Other Findings        Anesthesia Plan  ASA Score- 2     Anesthesia Type- general with ASA Monitors  Additional Monitors:   Airway Plan: LMA  Plan Factors-    Chart reviewed  Patient summary reviewed  Patient is not a current smoker  Patient instructed to abstain from smoking on day of procedure  Induction- intravenous  Postoperative Plan-     Informed Consent- Anesthetic plan and risks discussed with patient

## 2021-08-19 NOTE — ANESTHESIA POSTPROCEDURE EVALUATION
Post-Op Assessment Note    CV Status:  Stable  Pain Score: 1    Pain management: adequate     Mental Status:  Alert and awake   Hydration Status:  Euvolemic   PONV Controlled:  Controlled   Airway Patency:  Patent      Post Op Vitals Reviewed: Yes      Reason for prolonged intubation > 24 hours:  N/aReason for prolonged intubation > 48 hours:  N/a      No complications documented      BP      Temp     Pulse     Resp      SpO2

## 2021-08-19 NOTE — DISCHARGE INSTRUCTIONS
Hysteroscopy   WHAT YOU NEED TO KNOW:   A hysteroscopy is a procedure to find and treat problems in your uterus  A hysteroscopy may be done to find, and possibly treat, the cause of abnormal vaginal bleeding, problems getting pregnant, or miscarriage  It may also be done to insert or remove a device that prevents pregnancy  DISCHARGE INSTRUCTIONS:   Call 911 if:   · You have trouble breathing  Seek care immediately if:   · You have heavy vaginal bleeding that fills 1 or more sanitary pads in 1 hour  · You have severe pain or bloating in your abdomen  · You feel lightheaded, weak, and confused  · You see blood in your urine  · You stop urinating or urinate less than usual     Contact your healthcare provider if:   · You have a fever or chills  · You have pus or a foul-smelling odor coming from your vagina  · You see blood clots on your sanitary pad that are larger than the size of a quarter  · You have nausea or are vomiting  · Your skin is itchy, swollen, or you have a rash  · You have questions or concerns about your condition or care  Medicines: You may need any of the following:  · Estrogen  helps heal the lining of your uterus  · Antibiotics  help prevent a bacterial infection  · Prescription pain medicine  may be given  Ask your healthcare provider how to take this medicine safely  Some prescription pain medicines contain acetaminophen  Do not take other medicines that contain acetaminophen without talking to your healthcare provider  Too much acetaminophen may cause liver damage  Prescription pain medicine may cause constipation  Ask your healthcare provider how to prevent or treat constipation  · NSAIDs , such as ibuprofen, help decrease swelling, pain, and fever  NSAIDs can cause stomach bleeding or kidney problems in certain people  If you take blood thinner medicine, always ask your healthcare provider if NSAIDs are safe for you   Always read the medicine label and follow directions  · Take your medicine as directed  Contact your healthcare provider if you think your medicine is not helping or if you have side effects  Tell him or her if you are allergic to any medicine  Keep a list of the medicines, vitamins, and herbs you take  Include the amounts, and when and why you take them  Bring the list or the pill bottles to follow-up visits  Carry your medicine list with you in case of an emergency  Self-care:  Do not have sex, use tampons, or douche for 6 weeks  These actions may cause an infection  Ask your healthcare provider if it is okay to take a tub bath or swim  Rest as needed  Do not drive, return to work, or exercise for 24 hours or as directed  You can return to most activities in 1 to 2 days  Follow up with your healthcare provider as directed:  Write down your questions so you remember to ask them during your visits  © [employer] 2021 Information is for End User's use only and may not be sold, redistributed or otherwise used for commercial purposes  All illustrations and images included in CareNotes® are the copyrighted property of A D A M , Inc  or [address] [place] [address]  The above information is an  only  It is not intended as medical advice for individual conditions or treatments  Talk to your doctor, nurse or pharmacist before following any medical regimen to see if it is safe and effective for you  Dilation and Curettage   WHAT YOU SHOULD KNOW:   Dilation and curettage (D and C) is a procedure to remove tissue from the lining of your uterus  AFTER YOU LEAVE:   Medicines:   · Pain medicine: You may be given medicine to take away or decrease pain  Do not wait until the pain is severe before you take your medicine  · Antibiotics: This medicine will help fight or prevent an infection  · Take your medicine as directed    Call your healthcare provider if you think your medicine is not helping or if you have side effects  Tell him if you are allergic to any medicine  Keep a list of the medicines, vitamins, and herbs you take  Include the amounts, and when and why you take them  Bring the list or the pill bottles to follow-up visits  Carry your medicine list with you in case of an emergency  Follow up with your healthcare provider as directed:  Write down your questions so you remember to ask them during your visits  Activity:  Ask your primary healthcare provider when you can return to your normal activities  Contact your primary healthcare provider if:   · You have foul-smelling vaginal discharge  · You do not get your monthly period  · You feel depressed or anxious  · You feel very tired and weak  · You have questions or concerns about your condition or care  Seek care immediately or call 911 if:   · You have heavy vaginal bleeding that soaks 1 pad in 1 hour for 2 hours in a row  · You have a fever and abdominal cramps  · Your pain does not get better, even after treatment  © 2014 0718 Karen Chacko is for End User's use only and may not be sold, redistributed or otherwise used for commercial purposes  All illustrations and images included in CareNotes® are the copyrighted property of A D A M , Inc  or Dale Nevarez  The above information is an  only  It is not intended as medical advice for individual conditions or treatments  Talk to your doctor, nurse or pharmacist before following any medical regimen to see if it is safe and effective for you  Endometrial Ablation   WHAT YOU SHOULD KNOW:   Endometrial ablation (EA) is a procedure to destroy the endometrium (lining of your uterus)  You may need EA if you have heavy or abnormal vaginal bleeding  AFTER YOU LEAVE:   Medicines:   · Medicines  can help decrease pain, calm your stomach, and control vomiting  · Take your medicine as directed    Call your healthcare provider if you think your medicine is not helping or if you have side effects  Tell him if you are allergic to any medicine  Keep a list of the medicines, vitamins, and herbs you take  Include the amounts, and when and why you take them  Bring the list or the pill bottles to follow-up visits  Carry your medicine list with you in case of an emergency  What to expect after your procedure:   · Cramps, similar to menstrual cramps, for 1 to 2 days    · Watery, bloody discharge for 2 to 3 days that may become light and last a few weeks    · Frequent urination for 24 hours    · Nausea  Activity:  Ask when you can return to your normal activities  You may need to avoid sex for 6 weeks after your procedure  Birth control: You may need to use birth control after your procedure to prevent pregnancy  Pregnancy risks, such as a miscarriage, are higher after EA  Talk to your healthcare provider about birth control and having children after EA  Follow up with your healthcare provider as directed:  Write down your questions so you remember to ask them during your visits  Contact your healthcare provider if:   · The bleeding during your monthly periods has not decreased  · You have pain when you urinate  · You have questions or concerns about your condition or care  Seek care immediately or call 911 if:   · You feel lightheaded, short of breath, and have chest pain  · You cough up blood  · Your arm or leg feels warm, tender, and painful  It may look swollen and red  · You have vaginal bleeding and it is not time for your monthly period  · You have a fever  · You feel dizzy, weak, and confused  · You cannot stop vomiting  · You have severe pain  © 2014 9448 Karen Chacko is for End User's use only and may not be sold, redistributed or otherwise used for commercial purposes  All illustrations and images included in CareNotes® are the copyrighted property of [employer]  or Dale Nevarez    The above information is an  only  It is not intended as medical advice for individual conditions or treatments  Talk to your doctor, nurse or pharmacist before following any medical regimen to see if it is safe and effective for you

## 2021-08-19 NOTE — INTERVAL H&P NOTE
H&P reviewed  After examining the patient I find no changes in the patients condition since the H&P had been written      Vitals:    08/19/21 0802   BP: 138/74   Pulse: 74   Resp: 19   Temp: (!) 96 5 °F (35 8 °C)   SpO2: 96%

## 2021-08-19 NOTE — OP NOTE
OPERATIVE REPORT  PATIENT NAME: Almas Putnam    :  1975  MRN: [id number]  Pt Location: [facility] OR ROOM 02    SURGERY DATE: 2021    Surgeon(s) and Role:     * Tyler Reynaga DO - Primary     * [address] Gil MD - Assisting    Preop Diagnosis:  Excessive and frequent menstruation with regular cycle [N92 0]  Leiomyoma of uterus, unspecified [D25 9]    Post-Op Diagnosis Codes:     * Excessive and frequent menstruation with regular cycle [N92 0]     * Leiomyoma of uterus, unspecified [D25 9]    Procedure(s) (LRB):  D&C WITH DIAGNOSTIC HYSTEROSCOPY (N/A)  ABLATION ENDOMETRIAL RONAK (N/A)    Specimen(s):  ID Type Source Tests Collected by Time Destination   1 : [facility] [place] Tyler Reynaga DO 2021 1005        Estimated Blood Loss:   Minimal    Anesthesia Type:   General    Operative Indications:  Excessive and frequent menstruation with regular cycle [N92 0]  Leiomyoma of uterus, unspecified [D25 9]    Operative Findings:  1  External genitalia grossly normal in appearance  No ulcerations, no lacerations, no lesions  Bimanual exam revealed retroverted uterus with normal contours and freely mobile  No adnexal masses palpated bilaterally  2   Vagina and cervix were grossly normal in appearance without any lacerations or lesions  3  Uterus sounded to 9 cm  4  Hysteroscopic examination revealed proliferative endometrial lining  Anterior fibroid was noted measuring approximately 1 cm  Bilateral ostia were visualized  Complications:   None    Procedure and Technique:  Patient was taken to the operating room where timeout was performed to confirm correct patient and correct procedure  General anesthesia was established  Patient was positioned on the operating table in dorsal lithotomy position with legs supported using stirrups    An exam under anesthesia revealed an retroverted uterus which was freely mobile with no palpable adnexal masses  The patient was then prepped and draped in the usual sterile fashion  A weighted speculum was placed in the patient's vagina and the anterior lip of the cervix is visualized with the assistance of a Roger retractor  The anterior lip of the cervix was grasped using a single-toothed tenaculum  The uterus was gently sounded to approximately 9 cm  The cervix was then gradually dilated to 15 [place] using Isidro dilators  The cervix was measured at 3 cm  A 5 mm hysteroscope was inserted into the uterine cavity  The endometrial lining was noted to be proliferative appearing, ostia were visualized  Endometrial curettings were obtained using a small-sized sharp curette  Curettage of the endometrial cavity was undertaken in a systematic fashion, encompassing all 360° of the endometrial cavity  These were sent for routine pathology  The Adali endometrial ablation device was inserted into the endometrial cavity  The device was deployed and the cervical balloon was inflated  Safety checks were performed and the device was deployed  A full 2 minutes cycle was completed  The Adali device was then removed from the endometrial cavity  Patient tolerated the procedure well and went to recovery in stable condition        Patient Disposition:  PACU     SIGNATURE: [address]AV[address] Gil MD  DATE: August 19, 2021  TIME: 10:28 AM

## 2021-10-28 ENCOUNTER — HOSPITAL ENCOUNTER (OUTPATIENT)
Dept: MAMMOGRAPHY | Facility: MEDICAL CENTER | Age: 46
Discharge: HOME | End: 2021-10-28
Payer: COMMERCIAL

## 2021-10-28 VITALS — WEIGHT: 219 LBS | HEIGHT: 71 IN | BODY MASS INDEX: 30.66 KG/M2

## 2021-10-28 DIAGNOSIS — Z12.31 ENCOUNTER FOR SCREENING MAMMOGRAM FOR MALIGNANT NEOPLASM OF BREAST: ICD-10-CM

## 2021-10-28 PROCEDURE — 77063 BREAST TOMOSYNTHESIS BI: CPT

## 2021-10-28 PROCEDURE — 77067 SCR MAMMO BI INCL CAD: CPT

## 2021-10-30 ENCOUNTER — APPOINTMENT (OUTPATIENT)
Dept: LAB | Age: 46
End: 2021-10-30
Payer: COMMERCIAL

## 2021-10-30 DIAGNOSIS — Z79.899 ENCOUNTER FOR LONG-TERM (CURRENT) USE OF OTHER MEDICATIONS: ICD-10-CM

## 2021-10-30 DIAGNOSIS — E78.5 HYPERLIPIDEMIA, UNSPECIFIED HYPERLIPIDEMIA TYPE: ICD-10-CM

## 2021-10-30 LAB
ALBUMIN SERPL BCP-MCNC: 3.3 G/DL (ref 3.5–5)
ALP SERPL-CCNC: 95 U/L (ref 46–116)
ALT SERPL W P-5'-P-CCNC: 27 U/L (ref 12–78)
ANION GAP SERPL CALCULATED.3IONS-SCNC: 5 MMOL/L (ref 4–13)
AST SERPL W P-5'-P-CCNC: 13 U/L (ref 5–45)
BASOPHILS # BLD AUTO: 0.11 THOUSANDS/ΜL (ref 0–0.1)
BASOPHILS NFR BLD AUTO: 1 % (ref 0–1)
BILIRUB SERPL-MCNC: 0.19 MG/DL (ref 0.2–1)
BUN SERPL-MCNC: 16 MG/DL (ref 5–25)
CALCIUM ALBUM COR SERPL-MCNC: 9.4 MG/DL (ref 8.3–10.1)
CALCIUM SERPL-MCNC: 8.8 MG/DL (ref 8.3–10.1)
CHLORIDE SERPL-SCNC: 106 MMOL/L (ref 100–108)
CHOLEST SERPL-MCNC: 203 MG/DL (ref 50–200)
CO2 SERPL-SCNC: 27 MMOL/L (ref 21–32)
CREAT SERPL-MCNC: 0.62 MG/DL (ref 0.6–1.3)
EOSINOPHIL # BLD AUTO: 0.13 THOUSAND/ΜL (ref 0–0.61)
EOSINOPHIL NFR BLD AUTO: 2 % (ref 0–6)
ERYTHROCYTE [DISTWIDTH] IN BLOOD BY AUTOMATED COUNT: 19.3 % (ref 11.6–15.1)
GFR SERPL CREATININE-BSD FRML MDRD: 107 ML/MIN/1.73SQ M
GLUCOSE P FAST SERPL-MCNC: 89 MG/DL (ref 65–99)
HCT VFR BLD AUTO: 37.8 % (ref 34.8–46.1)
HDLC SERPL-MCNC: 77 MG/DL
HGB BLD-MCNC: 11.1 G/DL (ref 11.5–15.4)
IMM GRANULOCYTES # BLD AUTO: 0.02 THOUSAND/UL (ref 0–0.2)
IMM GRANULOCYTES NFR BLD AUTO: 0 % (ref 0–2)
LDLC SERPL CALC-MCNC: 89 MG/DL (ref 0–100)
LYMPHOCYTES # BLD AUTO: 1.84 THOUSANDS/ΜL (ref 0.6–4.47)
LYMPHOCYTES NFR BLD AUTO: 22 % (ref 14–44)
MCH RBC QN AUTO: 23.9 PG (ref 26.8–34.3)
MCHC RBC AUTO-ENTMCNC: 29.4 G/DL (ref 31.4–37.4)
MCV RBC AUTO: 82 FL (ref 82–98)
MONOCYTES # BLD AUTO: 0.35 THOUSAND/ΜL (ref 0.17–1.22)
MONOCYTES NFR BLD AUTO: 4 % (ref 4–12)
NEUTROPHILS # BLD AUTO: 5.83 THOUSANDS/ΜL (ref 1.85–7.62)
NEUTS SEG NFR BLD AUTO: 71 % (ref 43–75)
NONHDLC SERPL-MCNC: 126 MG/DL
NRBC BLD AUTO-RTO: 0 /100 WBCS
PLATELET # BLD AUTO: 440 THOUSANDS/UL (ref 149–390)
PMV BLD AUTO: 8.9 FL (ref 8.9–12.7)
POTASSIUM SERPL-SCNC: 4.2 MMOL/L (ref 3.5–5.3)
PROT SERPL-MCNC: 6.9 G/DL (ref 6.4–8.2)
RBC # BLD AUTO: 4.64 MILLION/UL (ref 3.81–5.12)
SODIUM SERPL-SCNC: 138 MMOL/L (ref 136–145)
TRIGL SERPL-MCNC: 186 MG/DL
TSH SERPL DL<=0.05 MIU/L-ACNC: 1.54 UIU/ML (ref 0.36–3.74)
WBC # BLD AUTO: 8.28 THOUSAND/UL (ref 4.31–10.16)

## 2021-10-30 PROCEDURE — 84443 ASSAY THYROID STIM HORMONE: CPT

## 2021-10-30 PROCEDURE — 80157 ASSAY CARBAMAZEPINE FREE: CPT

## 2021-10-30 PROCEDURE — 80053 COMPREHEN METABOLIC PANEL: CPT

## 2021-10-30 PROCEDURE — 85025 COMPLETE CBC W/AUTO DIFF WBC: CPT

## 2021-10-30 PROCEDURE — 80061 LIPID PANEL: CPT

## 2021-10-30 PROCEDURE — 36415 COLL VENOUS BLD VENIPUNCTURE: CPT

## 2021-11-02 LAB — CARBAMAZEPINE FREE SERPL-MCNC: 1.5 UG/ML (ref 0.6–4.2)

## 2022-01-03 ENCOUNTER — NURSE TRIAGE (OUTPATIENT)
Dept: OTHER | Facility: OTHER | Age: 47
End: 2022-01-03

## 2022-01-03 DIAGNOSIS — Z20.828 SARS-ASSOCIATED CORONAVIRUS EXPOSURE: Primary | ICD-10-CM

## 2022-01-04 PROCEDURE — U0003 INFECTIOUS AGENT DETECTION BY NUCLEIC ACID (DNA OR RNA); SEVERE ACUTE RESPIRATORY SYNDROME CORONAVIRUS 2 (SARS-COV-2) (CORONAVIRUS DISEASE [COVID-19]), AMPLIFIED PROBE TECHNIQUE, MAKING USE OF HIGH THROUGHPUT TECHNOLOGIES AS DESCRIBED BY CMS-2020-01-R: HCPCS | Performed by: FAMILY MEDICINE

## 2022-01-04 PROCEDURE — U0005 INFEC AGEN DETEC AMPLI PROBE: HCPCS | Performed by: FAMILY MEDICINE

## 2022-01-04 NOTE — TELEPHONE ENCOUNTER
Reason for Disposition   [1] COVID-19 infection suspected by caller or triager AND [2] mild symptoms (cough, fever, or others) AND [3] has not gotten tested yet    Answer Assessment - Initial Assessment Questions  Were you within 6 feet or less, for up to 15 minutes or more with a person that has a confirmed COVID-19 test? Yes, coworkers  What was the date of your exposure?  12/29  Are you experiencing any symptoms attributed to the virus?  (Assess for SOB, cough, fever, difficulty breathing) dry cough, headache, losing voice, pressure behind eyes  HIGH RISK: Do you have any history heart or lung conditions, weakened immune system, diabetes, Asthma, CHF, HIV, COPD, Chemo, renal failure, sickle cell, etc? no  PREGNANCY: Are you pregnant or did you recently give birth? no    Protocols used: CORONAVIRUS (COVID-19) DIAGNOSED OR SUSPECTED-ADULT-

## 2022-01-04 NOTE — TELEPHONE ENCOUNTER
Regarding: COVID EMPLOYEE SYMPTOMATIC COUGH  ----- Message from Natalie Campbell sent at 1/3/2022  9:14 PM EST -----  Patient called to request to be tested due to symptoms of sore throat, dry cough, headache, losing her voice  She feels pressure behind her eyes  She was exposed to a co-workers

## 2022-02-12 ENCOUNTER — APPOINTMENT (OUTPATIENT)
Dept: LAB | Age: 47
End: 2022-02-12
Payer: COMMERCIAL

## 2022-02-12 DIAGNOSIS — Z79.899 ENCOUNTER FOR LONG-TERM (CURRENT) USE OF OTHER MEDICATIONS: ICD-10-CM

## 2022-02-12 LAB
ALBUMIN SERPL BCP-MCNC: 3.8 G/DL (ref 3.5–5)
ALP SERPL-CCNC: 96 U/L (ref 46–116)
ALT SERPL W P-5'-P-CCNC: 48 U/L (ref 12–78)
ANION GAP SERPL CALCULATED.3IONS-SCNC: 4 MMOL/L (ref 4–13)
AST SERPL W P-5'-P-CCNC: 25 U/L (ref 5–45)
BASOPHILS # BLD AUTO: 0.09 THOUSANDS/ΜL (ref 0–0.1)
BASOPHILS NFR BLD AUTO: 1 % (ref 0–1)
BILIRUB SERPL-MCNC: 0.48 MG/DL (ref 0.2–1)
BUN SERPL-MCNC: 14 MG/DL (ref 5–25)
CALCIUM SERPL-MCNC: 9.4 MG/DL (ref 8.3–10.1)
CARBAMAZEPINE SERPL-MCNC: 7.2 UG/ML (ref 4–12)
CHLORIDE SERPL-SCNC: 107 MMOL/L (ref 100–108)
CHOLEST SERPL-MCNC: 225 MG/DL
CO2 SERPL-SCNC: 27 MMOL/L (ref 21–32)
CREAT SERPL-MCNC: 0.73 MG/DL (ref 0.6–1.3)
EOSINOPHIL # BLD AUTO: 0.14 THOUSAND/ΜL (ref 0–0.61)
EOSINOPHIL NFR BLD AUTO: 2 % (ref 0–6)
ERYTHROCYTE [DISTWIDTH] IN BLOOD BY AUTOMATED COUNT: 16.4 % (ref 11.6–15.1)
GFR SERPL CREATININE-BSD FRML MDRD: 97 ML/MIN/1.73SQ M
GLUCOSE P FAST SERPL-MCNC: 90 MG/DL (ref 65–99)
HCT VFR BLD AUTO: 40.6 % (ref 34.8–46.1)
HDLC SERPL-MCNC: 46 MG/DL
HGB BLD-MCNC: 12.2 G/DL (ref 11.5–15.4)
IMM GRANULOCYTES # BLD AUTO: 0.02 THOUSAND/UL (ref 0–0.2)
IMM GRANULOCYTES NFR BLD AUTO: 0 % (ref 0–2)
LDLC SERPL CALC-MCNC: 130 MG/DL (ref 0–100)
LYMPHOCYTES # BLD AUTO: 1.8 THOUSANDS/ΜL (ref 0.6–4.47)
LYMPHOCYTES NFR BLD AUTO: 22 % (ref 14–44)
MCH RBC QN AUTO: 25.8 PG (ref 26.8–34.3)
MCHC RBC AUTO-ENTMCNC: 30 G/DL (ref 31.4–37.4)
MCV RBC AUTO: 86 FL (ref 82–98)
MONOCYTES # BLD AUTO: 0.48 THOUSAND/ΜL (ref 0.17–1.22)
MONOCYTES NFR BLD AUTO: 6 % (ref 4–12)
NEUTROPHILS # BLD AUTO: 5.72 THOUSANDS/ΜL (ref 1.85–7.62)
NEUTS SEG NFR BLD AUTO: 69 % (ref 43–75)
NONHDLC SERPL-MCNC: 179 MG/DL
NRBC BLD AUTO-RTO: 0 /100 WBCS
PLATELET # BLD AUTO: 406 THOUSANDS/UL (ref 149–390)
PMV BLD AUTO: 9.1 FL (ref 8.9–12.7)
POTASSIUM SERPL-SCNC: 4.1 MMOL/L (ref 3.5–5.3)
PROT SERPL-MCNC: 7.5 G/DL (ref 6.4–8.2)
RBC # BLD AUTO: 4.72 MILLION/UL (ref 3.81–5.12)
SODIUM SERPL-SCNC: 138 MMOL/L (ref 136–145)
TRIGL SERPL-MCNC: 244 MG/DL
TSH SERPL DL<=0.05 MIU/L-ACNC: 2.2 UIU/ML (ref 0.36–3.74)
WBC # BLD AUTO: 8.25 THOUSAND/UL (ref 4.31–10.16)

## 2022-02-12 PROCEDURE — 85025 COMPLETE CBC W/AUTO DIFF WBC: CPT

## 2022-02-12 PROCEDURE — 36415 COLL VENOUS BLD VENIPUNCTURE: CPT

## 2022-02-12 PROCEDURE — 84443 ASSAY THYROID STIM HORMONE: CPT

## 2022-02-12 PROCEDURE — 80061 LIPID PANEL: CPT

## 2022-02-12 PROCEDURE — 80156 ASSAY CARBAMAZEPINE TOTAL: CPT

## 2022-02-12 PROCEDURE — 80053 COMPREHEN METABOLIC PANEL: CPT

## 2022-05-14 ENCOUNTER — APPOINTMENT (OUTPATIENT)
Dept: LAB | Age: 47
End: 2022-05-14
Payer: COMMERCIAL

## 2022-05-14 DIAGNOSIS — Z79.899 ENCOUNTER FOR LONG-TERM (CURRENT) USE OF OTHER MEDICATIONS: ICD-10-CM

## 2022-05-14 LAB
ALBUMIN SERPL BCP-MCNC: 3.7 G/DL (ref 3.5–5)
ALP SERPL-CCNC: 101 U/L (ref 46–116)
ALT SERPL W P-5'-P-CCNC: 49 U/L (ref 12–78)
ANION GAP SERPL CALCULATED.3IONS-SCNC: 3 MMOL/L (ref 4–13)
AST SERPL W P-5'-P-CCNC: 25 U/L (ref 5–45)
BILIRUB SERPL-MCNC: 0.27 MG/DL (ref 0.2–1)
BUN SERPL-MCNC: 14 MG/DL (ref 5–25)
CALCIUM SERPL-MCNC: 9.6 MG/DL (ref 8.3–10.1)
CARBAMAZEPINE SERPL-MCNC: 4.4 UG/ML (ref 4–12)
CHLORIDE SERPL-SCNC: 107 MMOL/L (ref 100–108)
CHOLEST SERPL-MCNC: 199 MG/DL
CO2 SERPL-SCNC: 30 MMOL/L (ref 21–32)
CREAT SERPL-MCNC: 0.82 MG/DL (ref 0.6–1.3)
EST. AVERAGE GLUCOSE BLD GHB EST-MCNC: 103 MG/DL
GFR SERPL CREATININE-BSD FRML MDRD: 84 ML/MIN/1.73SQ M
GLUCOSE P FAST SERPL-MCNC: 93 MG/DL (ref 65–99)
HBA1C MFR BLD: 5.2 %
HDLC SERPL-MCNC: 66 MG/DL
LDLC SERPL CALC-MCNC: 96 MG/DL (ref 0–100)
NONHDLC SERPL-MCNC: 133 MG/DL
POTASSIUM SERPL-SCNC: 4.3 MMOL/L (ref 3.5–5.3)
PROT SERPL-MCNC: 7.4 G/DL (ref 6.4–8.2)
SODIUM SERPL-SCNC: 140 MMOL/L (ref 136–145)
TRIGL SERPL-MCNC: 183 MG/DL
TSH SERPL DL<=0.05 MIU/L-ACNC: 1.47 UIU/ML (ref 0.45–4.5)

## 2022-05-14 PROCEDURE — 80156 ASSAY CARBAMAZEPINE TOTAL: CPT

## 2022-05-14 PROCEDURE — 36415 COLL VENOUS BLD VENIPUNCTURE: CPT

## 2022-05-14 PROCEDURE — 80061 LIPID PANEL: CPT

## 2022-05-14 PROCEDURE — 80053 COMPREHEN METABOLIC PANEL: CPT

## 2022-05-14 PROCEDURE — 83036 HEMOGLOBIN GLYCOSYLATED A1C: CPT

## 2022-05-14 PROCEDURE — 84443 ASSAY THYROID STIM HORMONE: CPT

## 2022-08-24 ENCOUNTER — OFFICE VISIT (OUTPATIENT)
Dept: FAMILY MEDICINE CLINIC | Facility: CLINIC | Age: 47
End: 2022-08-24
Payer: COMMERCIAL

## 2022-08-24 VITALS
HEIGHT: 71 IN | WEIGHT: 237.6 LBS | OXYGEN SATURATION: 99 % | HEART RATE: 63 BPM | BODY MASS INDEX: 33.26 KG/M2 | DIASTOLIC BLOOD PRESSURE: 84 MMHG | TEMPERATURE: 97.8 F | SYSTOLIC BLOOD PRESSURE: 126 MMHG

## 2022-08-24 DIAGNOSIS — I10 ESSENTIAL HYPERTENSION: ICD-10-CM

## 2022-08-24 DIAGNOSIS — Z00.00 ANNUAL PHYSICAL EXAM: Primary | ICD-10-CM

## 2022-08-24 PROBLEM — F41.9 ANXIETY: Status: ACTIVE | Noted: 2019-12-31

## 2022-08-24 PROBLEM — R53.82 CHRONIC FATIGUE: Status: ACTIVE | Noted: 2017-07-31

## 2022-08-24 PROCEDURE — 99396 PREV VISIT EST AGE 40-64: CPT | Performed by: FAMILY MEDICINE

## 2022-08-24 RX ORDER — ATENOLOL 25 MG/1
25 TABLET ORAL DAILY
COMMUNITY
Start: 2022-06-21 | End: 2022-10-17 | Stop reason: SDUPTHER

## 2022-08-24 RX ORDER — LOSARTAN POTASSIUM 100 MG/1
100 TABLET ORAL DAILY
COMMUNITY
Start: 2022-08-22

## 2022-08-24 RX ORDER — DESVENLAFAXINE 50 MG/1
150 TABLET, EXTENDED RELEASE ORAL DAILY
COMMUNITY
Start: 2022-07-29

## 2022-08-24 NOTE — PROGRESS NOTES
Assessment/Plan:       1  Annual physical exam  Assessment & Plan:  Reviewed age-appropriate health maintenance and preventive care  2  Essential hypertension  Assessment & Plan:  Well controlled  No changes          Subjective:      Patient ID: Nette Merlos is a 50 y o  female  Man Chan is doing really well  Her mood is stable  She continues to follow with Dr Chriss Dance  She has no active chest pain or shortness of breath  Her blood pressure is now excellent  We discussed importance of diet exercise and weight loss  She just saw her gynecologist yesterday she does have a mammogram scheduled  I reviewed her blood work from May and everything looks good  The following portions of the patient's history were reviewed and updated as appropriate: allergies, current medications, past family history, past medical history, past social history, past surgical history, and problem list     Review of Systems   Respiratory: Negative  Cardiovascular: Negative  Gastrointestinal: Negative  All other systems reviewed and are negative  Objective:      /84 (BP Location: Left arm, Patient Position: Sitting)   Pulse 63   Temp 97 8 °F (36 6 °C)   Ht 5' 11" (1 803 m)   Wt 108 kg (237 lb 9 6 oz)   SpO2 99%   BMI 33 14 kg/m²          Physical Exam  Vitals and nursing note reviewed  Constitutional:       Appearance: Normal appearance  HENT:      Head: Normocephalic and atraumatic  Nose: Nose normal       Mouth/Throat:      Mouth: Mucous membranes are moist    Eyes:      Extraocular Movements: Extraocular movements intact  Pupils: Pupils are equal, round, and reactive to light  Cardiovascular:      Rate and Rhythm: Normal rate and regular rhythm  Pulses: Normal pulses  Pulmonary:      Effort: Pulmonary effort is normal       Breath sounds: Normal breath sounds  Abdominal:      General: Bowel sounds are normal       Palpations: Abdomen is soft     Musculoskeletal: Cervical back: Normal range of motion  Skin:     General: Skin is warm and dry  Capillary Refill: Capillary refill takes less than 2 seconds  Neurological:      General: No focal deficit present  Mental Status: She is alert  Psychiatric:         Mood and Affect: Mood normal          BMI Counseling: Body mass index is 33 14 kg/m²  The BMI is above normal  Nutrition recommendations include reducing portion sizes and decreasing overall calorie intake

## 2022-08-24 NOTE — PATIENT INSTRUCTIONS
Weight Management   AMBULATORY CARE:   Why it is important to manage your weight:  Being overweight increases your risk of health conditions such as heart disease, high blood pressure, type 2 diabetes, and certain types of cancer  It can also increase your risk for osteoarthritis, sleep apnea, and other respiratory problems  Aim for a slow, steady weight loss  Even a small amount of weight loss can lower your risk of health problems  Risks of being overweight:  Extra weight can cause many health problems, including the following:  · Diabetes (high blood sugar level)    · High blood pressure or high cholesterol    · Heart disease    · Stroke    · Gallbladder or liver disease    · Cancer of the colon, breast, prostate, liver, or kidney    · Sleep apnea    · Arthritis or gout    Screening  is done to check for health conditions before you have signs or symptoms  If you are 28to 79years old, your blood sugar level may be checked every 3 years for signs of prediabetes or diabetes  Your healthcare provider will check your blood pressure at each visit  High blood pressure can lead to a stroke or other problems  Your provider may check for signs of heart disease, cancer, or other health problems  How to lose weight safely:  A safe and healthy way to lose weight is to eat fewer calories and get regular exercise  · You can lose up about 1 pound a week by decreasing the number of calories you eat by 500 calories each day  You can decrease calories by eating smaller portion sizes or by cutting out high-calorie foods  Read labels to find out how many calories are in the foods you eat  · You can also burn calories with exercise such as walking, swimming, or biking  You will be more likely to keep weight off if you make these changes part of your lifestyle  Exercise at least 30 minutes per day on most days of the week   You can also fit in more physical activity by taking the stairs instead of the elevator or parking farther away from stores  Ask your healthcare provider about the best exercise plan for you  Healthy meal plan for weight management:  A healthy meal plan includes a variety of foods, contains fewer calories, and helps you stay healthy  A healthy meal plan includes the following:     · Eat whole-grain foods more often  A healthy meal plan should contain fiber  Fiber is the part of grains, fruits, and vegetables that is not broken down by your body  Whole-grain foods are healthy and provide extra fiber in your diet  Some examples of whole-grain foods are whole-wheat breads and pastas, oatmeal, brown rice, and bulgur  · Eat a variety of vegetables every day  Include dark, leafy greens such as spinach, kale, florentin greens, and mustard greens  Eat yellow and orange vegetables such as carrots, sweet potatoes, and winter squash  · Eat a variety of fruits every day  Choose fresh or canned fruit (canned in its own juice or light syrup) instead of juice  Fruit juice has very little or no fiber  · Eat low-fat dairy foods  Drink fat-free (skim) milk or 1% milk  Eat fat-free yogurt and low-fat cottage cheese  Try low-fat cheeses such as mozzarella and other reduced-fat cheeses  · Choose meat and other protein foods that are low in fat  Choose beans or other legumes such as split peas or lentils  Choose fish, skinless poultry (chicken or turkey), or lean cuts of red meat (beef or pork)  Before you cook meat or poultry, cut off any visible fat  · Use less fat and oil  Try baking foods instead of frying them  Add less fat, such as margarine, sour cream, regular salad dressing and mayonnaise to foods  Eat fewer high-fat foods  Some examples of high-fat foods include french fries, doughnuts, ice cream, and cakes  · Eat fewer sweets  Limit foods and drinks that are high in sugar  This includes candy, cookies, regular soda, and sweetened drinks  Ways to decrease calories:   · Eat smaller portions  ? Use a small plate with smaller servings  ? Do not eat second helpings  ? When you eat at a restaurant, ask for a box and place half of your meal in the box before you eat  ? Share an entrée with someone else  · Replace high-calorie snacks with healthy, low-calorie snacks  ? Choose fresh fruit, vegetables, fat-free rice cakes, or air-popped popcorn instead of potato chips, nuts, or chocolate  ? Choose water or calorie-free drinks instead of soda or sweetened drinks  · Do not shop for groceries when you are hungry  You may be more likely to make unhealthy food choices  Take a grocery list of healthy foods and shop after you have eaten  · Eat regular meals  Do not skip meals  Skipping meals can lead to overeating later in the day  This can make it harder for you to lose weight  Eat a healthy snack in place of a meal if you do not have time to eat a regular meal  Talk with a dietitian to help you create a meal plan and schedule that is right for you  Other things to consider as you try to lose weight:   · Be aware of situations that may give you the urge to overeat, such as eating while watching television  Find ways to avoid these situations  For example, read a book, go for a walk, or do crafts  · Meet with a weight loss support group or friends who are also trying to lose weight  This may help you stay motivated to continue working on your weight loss goals  © Copyright [employer] 2022 Information is for End User's use only and may not be sold, redistributed or otherwise used for commercial purposes  All illustrations and images included in CareNotes® are the copyrighted property of A D A M , Inc  or [facility] Charly Marmolejo   The above information is an  only  It is not intended as medical advice for individual conditions or treatments  Talk to your doctor, nurse or pharmacist before following any medical regimen to see if it is safe and effective for you      Low Fat Diet   AMBULATORY CARE:   A low-fat diet  is an eating plan that is low in total fat, unhealthy fat, and cholesterol  You may need to follow a low-fat diet if you have trouble digesting or absorbing fat  You may also need to follow this diet if you have high cholesterol  You can also lower your cholesterol by increasing the amount of fiber in your diet  Soluble fiber is a type of fiber that helps to decrease cholesterol levels  Different types of fat in food:   · Limit unhealthy fats  A diet that is high in cholesterol, saturated fat, and trans fat may cause unhealthy cholesterol levels  Unhealthy cholesterol levels increase your risk of heart disease  ? Cholesterol:  Limit intake of cholesterol to less than 200 mg per day  Cholesterol is found in meat, eggs, and dairy  ? Saturated fat:  Limit saturated fat to less than 7% of your total daily calories  Ask your dietitian how many calories you need each day  Saturated fat is found in butter, cheese, ice cream, whole milk, and palm oil  Saturated fat is also found in meat, such as beef, pork, chicken skin, and processed meats  Processed meats include sausage, hot dogs, and bologna  ? Trans fat:  Avoid trans fat as much as possible  Trans fat is used in fried and baked foods  Foods that say trans fat free on the label may still have up to 0 5 grams of trans fat per serving  · Include healthy fats  Replace foods that are high in saturated and trans fat with foods high in healthy fats  This may help to decrease high cholesterol levels  ? Monounsaturated fats: These are found in avocados, nuts, and vegetable oils, such as olive, canola, and sunflower oil  ? Polyunsaturated fats: These can be found in vegetable oils, such as soybean or corn oil  Omega-3 fats can help to decrease the risk of heart disease  Omega-3 fats are found in fish, such as salmon, herring, trout, and tuna   Omega-3 fats can also be found in plant foods, such as walnuts, flaxseed, soybeans, and canola oil  Foods to limit or avoid:   · Grains:      ? Snacks that are made with partially hydrogenated oils, such as chips, regular crackers, and butter-flavored popcorn    ? High-fat baked goods, such as biscuits, croissants, doughnuts, pies, cookies, and pastries    · Dairy:      ? Whole milk, 2% milk, and yogurt and ice cream made with whole milk    ? Half and half creamer, heavy cream, and whipping cream    ? Cheese, cream cheese, and sour cream    · Meats and proteins:      ? High-fat cuts of meat (T-bone steak, regular hamburger, and ribs)    ? Fried meat, poultry (turkey and chicken), and fish    ? Poultry (chicken and turkey) with skin    ? Cold cuts (salami or bologna), hot dogs, monique, and sausage    ? Whole eggs and egg yolks    · Vegetables and fruits with added fat:      ? Fried vegetables or vegetables in butter or high-fat sauces, such as cream or cheese sauces    ? Fried fruit or fruit served with butter or cream    · Fats:      ? Butter, stick margarine, and shortening    ? Coconut, palm oil, and palm kernel oil    Foods to include:   · Grains:      ? Whole-grain breads, cereals, pasta, and brown rice    ? Low-fat crackers and pretzels    · Vegetables and fruits:      ? Fresh, frozen, or canned vegetables (no salt or low-sodium)    ? Fresh, frozen, dried, or canned fruit (canned in light syrup or fruit juice)    ? Avocado    · Low-fat dairy products:      ? Nonfat (skim) or 1% milk    ? Nonfat or low-fat cheese, yogurt, and cottage cheese    · Meats and proteins:      ? Chicken or turkey with no skin    ? Baked or broiled fish    ? Lean beef and pork (loin, round, extra lean hamburger)    ? Beans and peas, unsalted nuts, soy products    ? Egg whites and substitutes    ? Seeds and nuts    · Fats:      ? Unsaturated oil, such as canola, olive, peanut, soybean, or sunflower oil    ? Soft or liquid margarine and vegetable oil spread    ?  Low-fat salad dressing    Other ways to decrease fat:   · Read food labels before you buy foods  Choose foods that have less than 30% of calories from fat  Choose low-fat or fat-free dairy products  Remember that fat free does not mean calorie free  These foods still contain calories, and too many calories can lead to weight gain  · Trim fat from meat and avoid fried food  Trim all visible fat from meat before you cook it  Remove the skin from poultry  Do not andrade meat, fish, or poultry  Bake, roast, boil, or broil these foods instead  Avoid fried foods  Eat a baked potato instead of Western Ary fries  Steam vegetables instead of sautéing them in butter  · Add less fat to foods  Use imitation monique bits on salads and baked potatoes instead of regular monique bits  Use fat-free or low-fat salad dressings instead of regular dressings  Use low-fat or nonfat butter-flavored topping instead of regular butter or margarine on popcorn and other foods  Ways to decrease fat in recipes:  Replace high-fat ingredients with low-fat or nonfat ones  This may cause baked goods to be drier than usual  You may need to use nonfat cooking spray on pans to prevent food from sticking  You also may need to change the amount of other ingredients, such as water, in the recipe  Try the following:  · Use low-fat or light margarine instead of regular margarine or shortening  · Use lean ground turkey breast or chicken, or lean ground beef (less than 5% fat) instead of hamburger  · Add 1 teaspoon of canola oil to 8 ounces of skim milk instead of using cream or half and half  · Use grated zucchini, carrots, or apples in breads instead of coconut  · Use blenderized, low-fat cottage cheese, plain tofu, or low-fat ricotta cheese instead of cream cheese  · Use 1 egg white and 1 teaspoon of canola oil, or use ¼ cup (2 ounces) of fat-free egg substitute instead of a whole egg       · Replace half of the oil that is called for in a recipe with applesauce when you bake  Use 3 tablespoons of cocoa powder and 1 tablespoon of canola oil instead of a square of baking chocolate  How to increase fiber:  Eat enough high-fiber foods to get 20 to 30 grams of fiber every day  Slowly increase your fiber intake to avoid stomach cramps, gas, and other problems  · Eat 3 ounces of whole-grain foods each day  An ounce is about 1 slice of bread  Eat whole-grain breads, such as whole-wheat bread  Whole wheat, whole-wheat flour, or other whole grains should be listed as the first ingredient on the food label  Replace white flour with whole-grain flour or use half of each in recipes  Whole-grain flour is heavier than white flour, so you may have to add more yeast or baking powder  · Eat a high-fiber cereal for breakfast   Oatmeal is a good source of soluble fiber  Look for cereals that have bran or fiber in the name  Choose whole-grain products, such as brown rice, barley, and whole-wheat pasta  · Eat more beans, peas, and lentils  For example, add beans to soups or salads  Eat at least 5 cups of fruits and vegetables each day  Eat fruits and vegetables with the peel because the peel is high in fiber  © Copyright [employer] 2022 Information is for End User's use only and may not be sold, redistributed or otherwise used for commercial purposes  All illustrations and images included in CareNotes® are the copyrighted property of A D A M , Inc  or Shantell Marmolejo   The above information is an  only  It is not intended as medical advice for individual conditions or treatments  Talk to your doctor, nurse or pharmacist before following any medical regimen to see if it is safe and effective for you

## 2022-09-06 ENCOUNTER — TELEPHONE (OUTPATIENT)
Dept: PSYCHIATRY | Facility: CLINIC | Age: 47
End: 2022-09-06

## 2022-09-06 NOTE — TELEPHONE ENCOUNTER
Behavorial Health Outpatient Intake Questions    Referred by: Self     Please advised interviewee that they need to answer all questions truthfully to allow for best care and any misrepresentations of information may affect their ability to be seen at this clinic   => Was this discussed? Yes     [facility] -     Presenting Problem (in patient's words):       Pt stated that she suffers from depression and anxiety  PT stated that she has had depression for 30 years  Are there any developmental disabilities? ? If yes, can they speak to you on the phone? If they are too limited to speak to you on phone, refer out No    Are you taking any psychiatric medications? Yes    => If yes, who prescribes? If yes, are they injectable medications? Carbatrol   wellbutrain   Prestic =pysch       Does the patient have a language barrier or hearing impairment? No    Have you been treated at [address] [place] [address] by a therapist or a doctor in the past? If yes, who? No    Has the patient been hospitalized for mental health? Yes   If yes, how long ago was last hospitalization and where was it? Oct 2007 at [facility]     Do you actively use alcohol or marijuana or illegal substances? If yes, what and how much - refer out to Drug and alcohol treatment if use is excessive or daily use of illegal substances No concerns of substance abuse are reported  Do you have a community treatment team or ? No    Legal History-     Does the patient have any history of arrests, alf/longterm time, or DUIs? No  If Yes-  1) What types of charges? 2) When were they last incarcerated? 3) Are they currently on parole or probation? Minor Child-    Who has custody of the child? Is there a custody agreement? If there is a custody agreement remind parent that they must bring a copy to the first appt or they will not be seen       Intake Team, please check with provider before scheduling if flags come up such as:  - complex case  - legal history (other than DUI)  - communication barrier concerns are present  - if, in your judgment, this needs further review    ACCEPTED as a patient Yes  => Appointment Date: 9/12/2022 with Ed Wagner     Referred Elsewhere? No    Name of [address] [place] [address] SR#[id number]  [id number] Phone #  If ins is primary or secondary  If patient is a minor, parents information such as Name, D  O B of guarantor

## 2022-09-12 ENCOUNTER — TELEMEDICINE (OUTPATIENT)
Dept: PSYCHIATRY | Facility: CLINIC | Age: 47
End: 2022-09-12
Payer: COMMERCIAL

## 2022-09-12 DIAGNOSIS — F31.30 BIPOLAR AFFECTIVE DISORDER, CURRENT EPISODE DEPRESSED, CURRENT EPISODE SEVERITY UNSPECIFIED (HCC): ICD-10-CM

## 2022-09-12 DIAGNOSIS — F41.9 ANXIETY: Primary | ICD-10-CM

## 2022-09-12 PROCEDURE — 90791 PSYCH DIAGNOSTIC EVALUATION: CPT | Performed by: SOCIAL WORKER

## 2022-09-12 NOTE — PSYCH
Assessment/Plan:      Diagnoses and all orders for this visit:    Anxiety    Bipolar affective disorder, current episode depressed, current episode severity unspecified ([facility] [address] )          Subjective: The purpose of today's session is for Clinician to complete initial assessment, PHQ9, and GAD7 with Amber Kowalski  Patient ID: Srini Sam is a 50 y o  female  HPI: Amber Kowalski has a history of depression and anxiety  She is currently taking medication  Pre-morbid level of function and History of Present Illness: "About 3 years ago I was feeling really good  I had lost some weight  I was very motivated  The summertime is my favorite time of the year, but I had no desire to be outside "  Previous Psychiatric/psychological treatment/year: Amber Kowalski reports that it's been "many years" since she's seen a therapist    Current Psychiatrist/Therapist: Dr Frances Nelson (Psychiatrist)  Outpatient and/or Partial and Other Community Resources Used (CTT, ICM, VNA): Inpatient and Outpatient       Problem Assessment: "I started seeing a therapist and psychiatrist about 14 years ago through [facility]  In my very early 17's, I was diagnosed with depression  During my pregnancy with my daughter, I was worried that I would hurt her  Not kill her, but hurt her  Three months after she was born, I admitted myself into the hospital  I wasn't taking care of myself  The past 15 years I've been on medication  Now, about 4 years ago I joined [place] [address]  I wasn't able to see my therapist anymore  I was still seeing my psychiatrist because of the medication  I'm not doing well  I'm very tired, lack of motivation  I eat way too much  Nothing has given me any pleasure in my life  I'm a single mom raising a daughter  I'm doing all of this  It's taking a toll on me ", she says  Amber Kowalski explains that she feels "blah"  Amber Kowalski says she sleeps too much  "I've stopped taking care of what I look like", she says   Amber Kowalski explains that her psychiatrist recommended that she sees a therapist  Samia Isaacs says she had a therapist "many years ago", which was very helpful for her  She says she stopped because her therapist's availability didn't align with her schedule  Samia Isaacs denies SI and HI      SOCIAL/VOCATION:  Family Constellation (include parents, relationship with each and pertinent Psych/Medical History):     Family History   Problem Relation Age of Onset    No Known Problems Mother     Thyroid cancer Father 54    No Known Problems Daughter     No Known Problems Maternal Grandmother     No Known Problems Maternal Grandfather     Breast cancer Paternal Grandmother 61    No Known Problems Paternal Grandfather     No Known Problems Maternal Aunt     No Known Problems Maternal Aunt     No Known Problems Maternal Aunt     No Known Problems Paternal Aunt        Mother: Very good relationship, very supportive   Father: Very good relationship, very supportive   Children: Yaneth Cisneros, 13year-old daughter, very good relationship         Samia Isaacs relates best to her father  she lives with her daughter  she does not live alone  Domestic Violence: No past history of domestic violence    Additional Comments related to family/relationships/peer support: Samia Isaacs says her parents are very supportive, and she has a hand full of friends that she can talk to about issues  School or Work History (strengths/limitations/needs): 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  Her highest grade level achieved was: Bachelors Degree     history includes: N/A    Financial status includes: "Money is something that worries me  Being a single parent and making sure we have a good roof over our head  It's stressful "    LEISURE ASSESSMENT (Include past and present hobbies/interests and level of involvement (Ex: Group/Club Affiliations): "I've basically put myself on the back burner for many years   I need to rediscover what brings me pleasure "  her primary language is [place]  Preferred language is [place]  Ethnic considerations are: None  Religions affiliations and level of involvement: None   Does spirituality help you cope? Yes     FUNCTIONAL STATUS: There has been a recent change in Jackson Calvin ability to do the following: does not need can service    Level of Assistance Needed/By Whom?: None    Jackson Calvin learns best by  visual and hands on    SUBSTANCE ABUSE ASSESSMENT: no substance abuse    Substance/Route/Age/Amount/Frequency/Last Use: N/A    DETOX HISTORY: N/A    Previous detox/rehab treatment: N/A      HEALTH ASSESSMENT: no referral to PCP needed    LEGAL: N/A    Prenatal History: N/A    Delivery History: N/A    Developmental Milestones: N/A  Temperament as an infant was normal     Temperament as a toddler was normal   Temperament at school age was normal   Temperament as a teenager was normal     Risk Assessment:   The following ratings are based on my N/A    Risk of Harm to Self:   Demographic risk factors include N/A  Historical Risk Factors include N/A  Recent Specific Risk Factors include N/A  Additional Factors for a Child or Adolescent N/A    Risk of Harm to Others:   Demographic Risk Factors include N/A  Historical Risk Factors include N/A  Recent Specific Risk Factors include N/A    Access to Weapons:   Jackson Calvin has access to the following weapons: No  The following steps have been taken to ensure weapons are properly secured: N/A    Based on the above information, the client presents the following risk of harm to self or others:  low    The following interventions are recommended:   no intervention changes    Notes regarding this Risk Assessment: Jackson Calvni denies SI and HI            Review Of Systems:     Mood Normal   Behavior Normal    Thought Content Normal   General Normal    Personality Normal   Other Psych Symptoms Normal   Constitutional Normal   ENT Normal   Cardiovascular Normal    Respiratory Normal    Gastrointestinal Normal   Genitourinary Normal Musculoskeletal Normal   Integumentary Normal    Neurological Normal    Endocrine Normal          Mental status:  Appearance calm and cooperative  and adequate hygiene and grooming   Mood mood appropriate   Affect affect appropriate    Speech a normal rate   Thought Processes normal thought processes   Hallucinations no hallucinations present    Thought Content no delusions   Abnormal Thoughts no suicidal thoughts  and no homicidal thoughts    Orientation  oriented to person and place and time   Remote Memory short term memory intact and long term memory intact   Attention Span concentration intact   Intellect Appears to be of Average Intelligence   Fund of Knowledge displays adequate knowledge of current events, adequate fund of knowledge regarding past history and adequate fund of knowledge regarding vocabulary    Insight Insight intact   Judgement judgment was intact   Muscle Strength Muscle strength and tone were normal and Normal gait    Language no difficulty naming common objects, no difficulty repeating a phrase  and no difficulty writing a sentence    Pain none   Pain Scale 0

## 2022-09-26 ENCOUNTER — TELEMEDICINE (OUTPATIENT)
Dept: PSYCHIATRY | Facility: CLINIC | Age: 47
End: 2022-09-26
Payer: COMMERCIAL

## 2022-09-26 DIAGNOSIS — F31.30 BIPOLAR AFFECTIVE DISORDER, CURRENT EPISODE DEPRESSED, CURRENT EPISODE SEVERITY UNSPECIFIED (HCC): Primary | ICD-10-CM

## 2022-09-26 PROCEDURE — 90834 PSYTX W PT 45 MINUTES: CPT | Performed by: SOCIAL WORKER

## 2022-09-26 NOTE — BH TREATMENT PLAN
Ricky Saini  1975       Date of Initial Treatment Plan: 9/26/2022  Date of Current Treatment Plan: 09/26/22    Treatment Plan Number: 1    Strengths/Personal Resources for Self Care: "I'm compassionate for others, but I guess I could learn to be compassionate with myself "    Diagnosis:   1  Bipolar affective disorder, current episode depressed, current episode severity unspecified ([facility] [address] )         Area of Needs: Stress management skills and increased self-esteem  Long Term Goal 1: Massimo Mendoza will demonstrate the ability to effectively manage feelings of stress  Target Date: 2/26/2023  Completion Date: TBD         Short Term Objectives for Goal 1: Que Vidales will learn to identify thoughts, feelings, and experiences that cause feelings of stress  , B Massimo Mendoza will learn to identify appropriate responses to feelings of stress  and Hezekiah Bamberger will learn to utilize healthy coping skills to manage feelings of stress  Long Term Goal 2: Massimo Mendoza will show an increase in self-care and self-esteem through clinical observation and self-report  Target Date: 2/26/2023  Completion Date: TBD    Short Term Objectives for Goal 2: Hazle Shape will identify negative self-talk messages used to reinforce low self-esteem, B Massimo Mendoza will identify accomplishments that would improve self-image and verbalize a plan to achieve those goals, C Massimo Mendoza will learn to use positive self-talk messages to build self-esteem  and  Massimo Mendoza will learn to utilize skills to increase self-care and well being            Long Term Goal # 3: N/A     Target Date: N/A  Completion Date: N/A    Short Term Objectives for Goal 3: N/A    GOAL 1: Modality: Individual 4x per month   Completion Date TBD and The person(s) responsible for carrying out the plan is  Client and Therapist     GOAL 2: Modality: Individual 4x per month   Completion Date TBD and The person(s) responsible for carrying out the plan is  Henny Brown Treatment Plan [facility]: Diagnosis and Treatment Plan explained to Erinn Camejo relates understanding diagnosis and is agreeable to Treatment Plan  Client Comments : Please share your thoughts, feelings, need and/or experiences regarding your treatment plan: "I think that's a good start "      Iliana Beltrán, 1975, actively participated in the creation of this treatment plan during a virtual session, using the [address] [place] [address] Street  Iliana Beltrán  provided verbal consent on 9/26/2022 at 4:35 PM  The treatment plan was transcribed into the [facility] Record at a later time

## 2022-09-26 NOTE — PSYCH
INDIVIDUAL SESSION NOTE     Length of time in session: 39 minutes, follow up in 2 weeks     Session start time: 4:06pm   Session End time: 4:45pm    Psychotherapy Provided: Individual      Diagnosis ICD-10-CM Associated Orders   1  Bipolar affective disorder, current episode depressed, current episode severity unspecified ([facility] )  F31 30           Goals addressed in session: Goal 1     Pain:      none    0    Current suicide risk:  minimal    Data: Met with Shaquille Reyes for a scheduled individual session  Topics discussed included emotional wellness, mood regulation and symptoms and weight & body image  Nawaf Lara is feeling okay today  Nawaf Lara shows evidence of utilizing effective communication skills, engaging in problem solving and maintaining emotional composure to manage mental health symptoms  During this session, this clinical used the following therapeutic modalities engagement strategies, supportive psychotherapy, client-centered therapy and motivational interviewing  Assessment:  Nawaf Lara presents with a normal mood  her affect is normal range and intensity, appropriate  Nawaf Lara was oriented x3  She was focused and engaged  Nawaf Lara exhibits growing therapeutic rapport with this clinician  she continues to exhibit willingness to work on treatment goals and objectives  Nawaf Lara presents with a minimal risk of suicide, minimal risk of self-harm and minimal of harm to others  Plan:  Nawaf Lara will return in 2 weeks for the next scheduled session  Between sessions, she  will complete self-care assessment and strengths exploration activity and will report back during the next session re: success and barriers  At the next session, this clinical will use engagement strategies, supportive psychotherapy, client-centered therapy, stress management skills, motivational interviewing and mindfulness-based strategies to address stress management, in an effort to assist Nawaf Lara with meeting treatment goals  Behavioral Health Treatment Plan [facility]: Diagnosis and Treatment Plan explained to Jaida Agent relates understanding diagnosis and is agreeable to Treatment Plan  Yes     Virtual Regular Visit    Verification of patient location:    Patient is located in the following state in which I hold an active license [place]      Assessment/Plan:    Problem List Items Addressed This Visit    None         Goals addressed in session: Goal 1          Reason for visit is   Chief Complaint   Patient presents with    Virtual Regular Visit        Encounter provider Bhanu Love LCSW    Provider located at [address] [place] [id number] [phone]      Recent Visits  No visits were found meeting these conditions  Showing recent visits within past 7 days and meeting all other requirements  Future Appointments  No visits were found meeting these conditions  Showing future appointments within next 150 days and meeting all other requirements       The patient was identified by name and date of birth  Ricky Saini was informed that this is a telemedicine visit and that the visit is being conducted through[employer] Embedded and patient was informed this is a secure, HIPAA-complaint platform  She agrees to proceed     My office door was closed  No one else was in the room  She acknowledged consent and understanding of privacy and security of the video platform  The patient has agreed to participate and understands they can discontinue the visit at any time  Patient is aware this is a billable service  Subjective  Ricky Saini is a 50 y o  female who presents for an individual therapy session today  Treatment plan was also created         HPI     Past Medical History:   Diagnosis Date    Anxiety     Depression     Hyperlipidemia     Hypertension     Wears glasses        Past Surgical History:   Procedure Laterality Date     SECTION      KNEE SURGERY Left     torn meniscus repair    [place] HYSTEROSCOPY,W/ENDO BX N/A 2021    Procedure: D&C WITH DIAGNOSTIC HYSTEROSCOPY;  Surgeon: Buddy Daley DO;  [facility];  Service: Gynecology    [place] HYSTEROSCOPY,W/ENDOMETRIAL ABLATION N/A 2021    Procedure: ABLATION ENDOMETRIAL RONAK;  Surgeon: Buddy Daley DO;  [facility];  Service: Gynecology       Current Outpatient Medications   Medication Sig Dispense Refill    ALPRAZolam (XANAX) 0 25 mg tablet Take 0 25 mg by mouth daily      amLODIPine (NORVASC) 5 mg tablet Take 5 mg by mouth daily      atenolol (TENORMIN) 25 mg tablet Take 25 mg by mouth daily      atorvastatin (LIPITOR) 80 mg tablet Take 80 mg by mouth daily      b complex vitamins tablet Take 1 tablet by mouth daily      buPROPion (WELLBUTRIN XL) 300 mg 24 hr tablet Take 300 mg by mouth daily      carBAMazepine (CARBATROL) 300 MG 12 hr capsule Take 300 mg by mouth 2 (two) times a day       cholecalciferol (VITAMIN D3) 1,000 units tablet Take 2,000 Units by mouth daily      cyanocobalamin (VITAMIN B-12) 1000 MCG tablet Take 1,000 mcg by mouth      desvenlafaxine succinate (PRISTIQ) 50 mg 24 hr tablet Take 150 mg by mouth in the morning      losartan (COZAAR) 100 MG tablet Take 100 mg by mouth in the morning      vitamin B-12 (VITAMIN B-12) 1,000 mcg tablet Take by mouth daily       No current facility-administered medications for this visit  Allergies   Allergen Reactions    Penicillins Hives    Rofecoxib GI Intolerance    Sulfa Antibiotics Rash    Sulfamethoxazole-Trimethoprim Rash       Review of Systems    Video Exam    There were no vitals filed for this visit      Physical Exam     I spent 36 minutes directly with the patient during this visit

## 2022-10-10 ENCOUNTER — TELEMEDICINE (OUTPATIENT)
Dept: PSYCHIATRY | Facility: CLINIC | Age: 47
End: 2022-10-10
Payer: COMMERCIAL

## 2022-10-10 DIAGNOSIS — F31.30 BIPOLAR AFFECTIVE DISORDER, CURRENT EPISODE DEPRESSED, CURRENT EPISODE SEVERITY UNSPECIFIED (HCC): Primary | ICD-10-CM

## 2022-10-10 PROCEDURE — 90832 PSYTX W PT 30 MINUTES: CPT | Performed by: SOCIAL WORKER

## 2022-10-10 NOTE — PSYCH
INDIVIDUAL SESSION NOTE     Length of time in session: 34 minutes, follow up in 1 week     Session start time: 4:06pm   Session End time: 4:40pm    Psychotherapy Provided: Individual      Diagnosis ICD-10-CM Associated Orders   1  Bipolar affective disorder, current episode depressed, current episode severity unspecified ([facility]  F31 30           Goals addressed in session: Goal 2     Pain:      none    0    Current suicide risk:  minimal    Data: Met with Ksenia Mejia for a scheduled individual session  Topics discussed included emotional wellness, weight & body image and self-esteem  Balbir Rodas is feeling well today  Balbir Rodas shows evidence of utilizing effective communication skills, engaging in problem solving and maintaining emotional composure to manage mental health symptoms  During this session, this clinical used the following therapeutic modalities engagement strategies, client-centered therapy, motivational interviewing and solution-focused therapy  Assessment:  Balbir Rodas presents with a normal mood  her affect is normal range and intensity, appropriate  Balbir Rodas was oriented x3  She was focused and engaged  Balbir Rodas exhibits good therapeutic rapport with this clinician  she continues to exhibit willingness to work on treatment goals and objectives  Balbir Rodas presents with a minimal risk of suicide, minimal risk of self-harm and minimal of harm to others  Plan:  Balbir Rodas will return in 1 week for the next scheduled session  Between sessions, she  Will create a calendar to incorporate walking into her daily routine and will report back during the next session re: success and barriers  At the next session, this clinical will use engagement strategies, supportive psychotherapy, client-centered therapy, motivational interviewing, solution-focused therapy and mindfulness-based strategies to address Miras self care, in an effort to [address] with meeting treatment goals        Behavioral Health Treatment Plan [facility]: Diagnosis and Treatment Plan explained to Laurence Almaguer relates understanding diagnosis and is agreeable to Treatment Plan  Yes     Virtual Regular Visit    Verification of patient location:    Patient is located in the following state in which I hold an active license [place]      Assessment/Plan:    Problem List Items Addressed This Visit        Other    Bipolar affective disorder, current episode depressed ([facility] [address] ) - Primary          Goals addressed in session: Goal 2          Reason for visit is   Chief Complaint   Patient presents with   • Virtual Regular Visit        Encounter provider Melvina Davis LCSW    Provider located at [address] [place] [id number] [phone]      Recent Visits  No visits were found meeting these conditions  Showing recent visits within past 7 days and meeting all other requirements  Future Appointments  No visits were found meeting these conditions  Showing future appointments within next 150 days and meeting all other requirements       The patient was identified by name and date of birth  Loco Davis was informed that this is a telemedicine visit and that the visit is being conducted throughEpic Embedded and patient was informed this is a secure, HIPAA-complaint platform  She agrees to proceed     My office door was closed  No one else was in the room  She acknowledged consent and understanding of privacy and security of the video platform  The patient has agreed to participate and understands they can discontinue the visit at any time  Patient is aware this is a billable service  Subjective  Loco Davis is a 50 y o  female who presents for an individual therapy session today         HPI     Past Medical History:   Diagnosis Date   • Anxiety    • Depression    • Hyperlipidemia    • Hypertension    • Wears glasses        Past Surgical History:   Procedure Laterality Date   •  SECTION     • KNEE SURGERY Left     torn meniscus repair   • [place] HYSTEROSCOPY,W/ENDO BX N/A 2021    Procedure: D&C WITH DIAGNOSTIC HYSTEROSCOPY;  Surgeon: Ewing Holter, DO;  [facility];  Service: Gynecology   • [place] HYSTEROSCOPY,W/ENDOMETRIAL ABLATION N/A 2021    Procedure: ABLATION ENDOMETRIAL RONAK;  Surgeon: Ewing Holter, DO;  [facility];  Service: Gynecology       Current Outpatient Medications   Medication Sig Dispense Refill   • ALPRAZolam (XANAX) 0 25 mg tablet Take 0 25 mg by mouth daily     • amLODIPine (NORVASC) 5 mg tablet Take 5 mg by mouth daily     • atenolol (TENORMIN) 25 mg tablet Take 25 mg by mouth daily     • atorvastatin (LIPITOR) 80 mg tablet Take 80 mg by mouth daily     • b complex vitamins tablet Take 1 tablet by mouth daily     • buPROPion (WELLBUTRIN XL) 300 mg 24 hr tablet Take 300 mg by mouth daily     • carBAMazepine (CARBATROL) 300 MG 12 hr capsule Take 300 mg by mouth 2 (two) times a day      • cholecalciferol (VITAMIN D3) 1,000 units tablet Take 2,000 Units by mouth daily     • cyanocobalamin (VITAMIN B-12) 1000 MCG tablet Take 1,000 mcg by mouth     • desvenlafaxine succinate (PRISTIQ) 50 mg 24 hr tablet Take 150 mg by mouth in the morning     • losartan (COZAAR) 100 MG tablet Take 100 mg by mouth in the morning     • vitamin B-12 (VITAMIN B-12) 1,000 mcg tablet Take by mouth daily       No current facility-administered medications for this visit  Allergies   Allergen Reactions   • Penicillins Hives   • Rofecoxib GI Intolerance   • Sulfa Antibiotics Rash   • Sulfamethoxazole-Trimethoprim Rash       Review of Systems    Video Exam    There were no vitals filed for this visit      Physical Exam     I spent 34 minutes directly with the patient during this visit

## 2022-10-15 ENCOUNTER — PATIENT MESSAGE (OUTPATIENT)
Dept: FAMILY MEDICINE CLINIC | Facility: CLINIC | Age: 47
End: 2022-10-15

## 2022-10-15 DIAGNOSIS — I10 ESSENTIAL HYPERTENSION: Primary | ICD-10-CM

## 2022-10-17 ENCOUNTER — TELEMEDICINE (OUTPATIENT)
Dept: PSYCHIATRY | Facility: CLINIC | Age: 47
End: 2022-10-17

## 2022-10-17 DIAGNOSIS — F31.30 BIPOLAR AFFECTIVE DISORDER, CURRENT EPISODE DEPRESSED, CURRENT EPISODE SEVERITY UNSPECIFIED (HCC): Primary | ICD-10-CM

## 2022-10-17 RX ORDER — ATENOLOL 25 MG/1
25 TABLET ORAL DAILY
Qty: 90 TABLET | Refills: 1 | Status: SHIPPED | OUTPATIENT
Start: 2022-10-17 | End: 2023-10-17

## 2022-10-17 NOTE — PSYCH
D: This therapist met with Earnest Pat for an individual therapy session  A: Earnest Pat was oriented x3  Earnest Pat was focused and engaged  Amdavid Menendez reports that she has been doing well over the past week  She explains that she ended up working overtime last week, and she was not as stressed out as she thought she would be  She says she handled it better than she thought she would  She does, however, tell Therapist that she was unable to do her assignment  Brad Menendez does not have anything that she would like to address today  Joeldavid Menendez will use this time practice her assignment and report back during her next session  Client denies SI and HI    P: Racquel Hudson's next session is scheduled for 10/24/2022 at 4pm     Virtual Regular Visit    Verification of patient location:    Patient is located in the following state in which I hold an active license [place]      Assessment/Plan:    Problem List Items Addressed This Visit        Other    Bipolar affective disorder, current episode depressed ([facility] ) - Primary          Goals addressed in session: Goal 1          Reason for visit is   Chief Complaint   Patient presents with   • Virtual Regular Visit        Encounter provider Fabby Maynard LCSW    Provider located at [address] [place] [id number] [phone]      Recent Visits  Date Type Provider Dept   10/10/22 Telemedicine Marsha Dawkins LCSW [facility]   Showing recent visits within past 7 days and meeting all other requirements  Future Appointments  No visits were found meeting these conditions  Showing future appointments within next 150 days and meeting all other requirements       The patient was identified by name and date of birth   Earnest Adilia was informed that this is a telemedicine visit and that [facility] and patient was informed that this is a secure, HIPAA-compliant platform  She agrees to proceed     My office door was closed  No one else was in the room  She acknowledged consent and understanding of privacy and security of the video platform  The patient has agreed to participate and understands they can discontinue the visit at any time  Patient is aware this is a billable service  Subjective  Teofilo Mar is a 50 y o  female who presents for an individual therapy session today         HPI     Past Medical History:   Diagnosis Date   • Anxiety    • Depression    • Hyperlipidemia    • Hypertension    • Wears glasses        Past Surgical History:   Procedure Laterality Date   •  SECTION     • KNEE SURGERY Left     torn meniscus repair   • [place] HYSTEROSCOPY,W/ENDO BX N/A 2021    Procedure: D&C WITH DIAGNOSTIC HYSTEROSCOPY;  Surgeon: Bernardo Culp DO;  [facility];  Service: Gynecology   • [place] HYSTEROSCOPY,W/ENDOMETRIAL ABLATION N/A 2021    Procedure: ABLATION ENDOMETRIAL RONAK;  Surgeon: Bernardo Culp DO;  [facility];  Service: Gynecology       Current Outpatient Medications   Medication Sig Dispense Refill   • ALPRAZolam (XANAX) 0 25 mg tablet Take 0 25 mg by mouth daily     • amLODIPine (NORVASC) 5 mg tablet Take 5 mg by mouth daily     • atenolol (TENORMIN) 25 mg tablet Take 25 mg by mouth daily     • atorvastatin (LIPITOR) 80 mg tablet Take 80 mg by mouth daily     • b complex vitamins tablet Take 1 tablet by mouth daily     • buPROPion (WELLBUTRIN XL) 300 mg 24 hr tablet Take 300 mg by mouth daily     • carBAMazepine (CARBATROL) 300 MG 12 hr capsule Take 300 mg by mouth 2 (two) times a day      • cholecalciferol (VITAMIN D3) 1,000 units tablet Take 2,000 Units by mouth daily     • cyanocobalamin (VITAMIN B-12) 1000 MCG tablet Take 1,000 mcg by mouth     • desvenlafaxine succinate (PRISTIQ) 50 mg 24 hr tablet Take 150 mg by mouth in the morning     • losartan (COZAAR) 100 MG tablet Take 100 mg by mouth in the morning     • vitamin B-12 (VITAMIN B-12) 1,000 mcg tablet Take by mouth daily       No current facility-administered medications for this visit  Allergies   Allergen Reactions   • Penicillins Hives   • Rofecoxib GI Intolerance   • Sulfa Antibiotics Rash   • Sulfamethoxazole-Trimethoprim Rash       Review of Systems    Video Exam    There were no vitals filed for this visit      Physical Exam     I spent 6 minutes directly with the patient during this visit       Visit Time    Visit Start Time: 4:03 PM  Visit Stop Time: 4:09 PM  Total Visit Duration: 6 minutes

## 2022-10-17 NOTE — TELEPHONE ENCOUNTER
Ruthie Drummond MA 10/16/2022 9:41 AM EDT      ----- Message -----  From: Shant Bryant  Sent: 10/15/2022 4:12 PM EDT  To: [facility]  Subject: Need refill on my medication     Hello, My name is Libby Herrera and I need a refill on one of my medications  It's for Atenolol, 25 mg  Please call that in to [facility] order at the [facility]  If you need to contact me, please call me on my cell at [phone]   Sharmila sifuentes, Magdaleno Beal

## 2022-10-19 DIAGNOSIS — F41.9 ANXIETY: Primary | ICD-10-CM

## 2022-10-19 NOTE — TELEPHONE ENCOUNTER
----- Message from Samia SIMENTAL  [facility] sent at 10/19/2022  9:59 AM EDT -----  Regarding: Prescription refill  Hello Doctor Jose Caballero, I hope all is well with you  I'm in need of getting my Xanax 25 mg prescription refilled  Doctor Roxana Smithshauna is out for a few weeks on vacation  Please can you send that to the [employer] order, [place] location       Take care,    Samia Isaacs

## 2022-10-20 RX ORDER — ALPRAZOLAM 0.25 MG/1
0.25 TABLET ORAL DAILY PRN
Qty: 30 TABLET | Refills: 0 | Status: SHIPPED | OUTPATIENT
Start: 2022-10-20

## 2022-10-22 ENCOUNTER — APPOINTMENT (OUTPATIENT)
Dept: LAB | Facility: IMAGING CENTER | Age: 47
End: 2022-10-22
Payer: COMMERCIAL

## 2022-10-22 DIAGNOSIS — Z79.899 ENCOUNTER FOR LONG-TERM (CURRENT) USE OF OTHER MEDICATIONS: ICD-10-CM

## 2022-10-22 LAB
BASOPHILS # BLD AUTO: 0.03 THOUSANDS/ÂΜL (ref 0–0.1)
BASOPHILS NFR BLD AUTO: 0 % (ref 0–1)
CARBAMAZEPINE SERPL-MCNC: 8 UG/ML (ref 4–12)
EOSINOPHIL # BLD AUTO: 0 THOUSAND/ÂΜL (ref 0–0.61)
EOSINOPHIL NFR BLD AUTO: 0 % (ref 0–6)
ERYTHROCYTE [DISTWIDTH] IN BLOOD BY AUTOMATED COUNT: 12.9 % (ref 11.6–15.1)
HCT VFR BLD AUTO: 44.5 % (ref 34.8–46.1)
HGB BLD-MCNC: 14.5 G/DL (ref 11.5–15.4)
IMM GRANULOCYTES # BLD AUTO: 0.03 THOUSAND/UL (ref 0–0.2)
IMM GRANULOCYTES NFR BLD AUTO: 0 % (ref 0–2)
LYMPHOCYTES # BLD AUTO: 1.83 THOUSANDS/ÂΜL (ref 0.6–4.47)
LYMPHOCYTES NFR BLD AUTO: 26 % (ref 14–44)
MCH RBC QN AUTO: 31.7 PG (ref 26.8–34.3)
MCHC RBC AUTO-ENTMCNC: 32.6 G/DL (ref 31.4–37.4)
MCV RBC AUTO: 97 FL (ref 82–98)
MONOCYTES # BLD AUTO: 0.45 THOUSAND/ÂΜL (ref 0.17–1.22)
MONOCYTES NFR BLD AUTO: 6 % (ref 4–12)
NEUTROPHILS # BLD AUTO: 4.69 THOUSANDS/ÂΜL (ref 1.85–7.62)
NEUTS SEG NFR BLD AUTO: 68 % (ref 43–75)
NRBC BLD AUTO-RTO: 0 /100 WBCS
PLATELET # BLD AUTO: 315 THOUSANDS/UL (ref 149–390)
PMV BLD AUTO: 9.1 FL (ref 8.9–12.7)
RBC # BLD AUTO: 4.57 MILLION/UL (ref 3.81–5.12)
WBC # BLD AUTO: 7.03 THOUSAND/UL (ref 4.31–10.16)

## 2022-10-22 PROCEDURE — 85025 COMPLETE CBC W/AUTO DIFF WBC: CPT

## 2022-10-22 PROCEDURE — 36415 COLL VENOUS BLD VENIPUNCTURE: CPT

## 2022-10-22 PROCEDURE — 80156 ASSAY CARBAMAZEPINE TOTAL: CPT

## 2022-10-31 DIAGNOSIS — E78.49 OTHER HYPERLIPIDEMIA: Primary | ICD-10-CM

## 2022-10-31 RX ORDER — ATORVASTATIN CALCIUM 80 MG/1
80 TABLET, FILM COATED ORAL DAILY
Qty: 90 TABLET | Refills: 1 | Status: SHIPPED | OUTPATIENT
Start: 2022-10-31

## 2022-11-01 ENCOUNTER — TELEPHONE (OUTPATIENT)
Dept: PSYCHIATRY | Facility: CLINIC | Age: 47
End: 2022-11-01

## 2022-11-25 ENCOUNTER — TELEPHONE (OUTPATIENT)
Dept: PSYCHIATRY | Facility: CLINIC | Age: 47
End: 2022-11-25

## 2022-12-04 ENCOUNTER — PATIENT MESSAGE (OUTPATIENT)
Dept: FAMILY MEDICINE CLINIC | Facility: CLINIC | Age: 47
End: 2022-12-04

## 2022-12-04 DIAGNOSIS — I10 BENIGN ESSENTIAL HTN: Primary | ICD-10-CM

## 2022-12-05 RX ORDER — AMLODIPINE BESYLATE 5 MG/1
5 TABLET ORAL DAILY
Qty: 90 TABLET | Refills: 3 | Status: SHIPPED | OUTPATIENT
Start: 2022-12-05

## 2022-12-12 ENCOUNTER — TELEPHONE (OUTPATIENT)
Dept: PSYCHIATRY | Facility: CLINIC | Age: 47
End: 2022-12-12

## 2022-12-12 NOTE — TELEPHONE ENCOUNTER
DISCHARGE LETTER for Allison Darling LCSW (certified and regular) placed in outgoing mail on 12/12/22      Article #:  [id number]     Address:  [place] [id number]

## 2022-12-17 ENCOUNTER — PATIENT MESSAGE (OUTPATIENT)
Dept: FAMILY MEDICINE CLINIC | Facility: CLINIC | Age: 47
End: 2022-12-17

## 2022-12-17 DIAGNOSIS — F41.9 ANXIETY: ICD-10-CM

## 2022-12-19 RX ORDER — ALPRAZOLAM 0.25 MG/1
0.25 TABLET ORAL DAILY PRN
Qty: 90 TABLET | Refills: 1 | Status: SHIPPED | OUTPATIENT
Start: 2022-12-19

## 2022-12-29 ENCOUNTER — TELEPHONE (OUTPATIENT)
Dept: PSYCHIATRY | Facility: CLINIC | Age: 47
End: 2022-12-29

## 2022-12-29 NOTE — TELEPHONE ENCOUNTER
Pt called and stated she recently ended TA and is interested in in person talk therapy  Female provider preferred and in [place] office   Pt was placed on employee wait list

## 2023-01-15 DIAGNOSIS — I10 ESSENTIAL HYPERTENSION: ICD-10-CM

## 2023-01-16 RX ORDER — ATENOLOL 25 MG/1
25 TABLET ORAL DAILY
Qty: 90 TABLET | Refills: 0 | Status: SHIPPED | OUTPATIENT
Start: 2023-01-16 | End: 2024-01-16

## 2023-01-31 ENCOUNTER — HOSPITAL ENCOUNTER (OUTPATIENT)
Dept: MAMMOGRAPHY | Facility: MEDICAL CENTER | Age: 48
Discharge: HOME | End: 2023-01-31

## 2023-01-31 VITALS — BODY MASS INDEX: 33.33 KG/M2 | WEIGHT: 238.1 LBS | HEIGHT: 71 IN

## 2023-01-31 DIAGNOSIS — Z12.31 ENCOUNTER FOR SCREENING MAMMOGRAM FOR MALIGNANT NEOPLASM OF BREAST: ICD-10-CM

## 2023-03-01 ENCOUNTER — OFFICE VISIT (OUTPATIENT)
Dept: FAMILY MEDICINE CLINIC | Facility: CLINIC | Age: 48
End: 2023-03-01

## 2023-03-01 VITALS
DIASTOLIC BLOOD PRESSURE: 78 MMHG | SYSTOLIC BLOOD PRESSURE: 124 MMHG | TEMPERATURE: 97.6 F | HEIGHT: 71 IN | WEIGHT: 239 LBS | BODY MASS INDEX: 33.46 KG/M2 | OXYGEN SATURATION: 98 % | HEART RATE: 68 BPM

## 2023-03-01 DIAGNOSIS — R63.5 WEIGHT GAIN: ICD-10-CM

## 2023-03-01 DIAGNOSIS — Z12.11 COLON CANCER SCREENING: ICD-10-CM

## 2023-03-01 DIAGNOSIS — E88.81 INSULIN RESISTANCE: ICD-10-CM

## 2023-03-01 DIAGNOSIS — E78.2 MIXED HYPERLIPIDEMIA: ICD-10-CM

## 2023-03-01 DIAGNOSIS — I10 ESSENTIAL HYPERTENSION: Primary | ICD-10-CM

## 2023-03-01 DIAGNOSIS — E66.9 OBESITY (BMI 30.0-34.9): ICD-10-CM

## 2023-03-01 DIAGNOSIS — F41.9 ANXIETY: ICD-10-CM

## 2023-03-01 NOTE — PROGRESS NOTES
Assessment/Plan:       1  Essential hypertension  Assessment & Plan:  Continue meds, no changes    Orders:  -     Comprehensive metabolic panel; Future  -     TSH, 3rd generation with Free T4 reflex; Future    2  Anxiety  Assessment & Plan:  Continue meds, no changes      3  Mixed hyperlipidemia  Assessment & Plan:  Continue meds, no changes      4  Obesity (BMI 30 0-34  9)  Comments:  start wegovy  diet/exercise  Orders:  -     TSH, 3rd generation with Free T4 reflex; Future  -     Semaglutide-Weight Management (WEGOVY) 0 25 MG/0 5ML; Inject 0 5 mL (0 25 mg total) under the skin once a week  -     Ambulatory referral to clinical pharmacy; Future    5  Insulin resistance  Comments:  meets criteria for metabolic syndrome  start wegovy  Orders:  -     Semaglutide-Weight Management (WEGOVY) 0 25 MG/0 5ML; Inject 0 5 mL (0 25 mg total) under the skin once a week  -     Ambulatory referral to clinical pharmacy; Future    6  Colon cancer screening  Comments:  cologuard ordered  Orders:  -     Cologuard    7  Weight gain        Subjective:      Patient ID: Neal Sher is a 46 y o  female  Norwood Seip is here for routine follow-up regarding essential hypertension, mixed hyperlipidemia, and anxiety  Her meds are stable I reviewed them she does see a therapist and Dr Kamron Garcia everything is going well her blood pressure is very well controlled  She is frustrated by inability to lose weight  She is trying to diet and exercise not same results  She technically does meet criteria for metabolic syndrome and insulin resistance  Working to try Loraine and see if it is covered through her insurance  She is also due for some blood work  And she is also due for colon cancer screening        The following portions of the patient's history were reviewed and updated as appropriate: allergies, current medications, past family history, past medical history, past social history, past surgical history, and problem list     Review of Systems   Respiratory: Negative  Cardiovascular: Negative  Gastrointestinal: Negative  Objective:      /78 (BP Location: Left arm, Patient Position: Sitting, Cuff Size: Standard)   Pulse 68   Temp 97 6 °F (36 4 °C)   Ht 5' 11" (1 803 m)   Wt 108 kg (239 lb)   LMP 08/24/2021 (Approximate)   SpO2 98%   BMI 33 33 kg/m²          Physical Exam  Vitals and nursing note reviewed  Constitutional:       Appearance: Normal appearance  HENT:      Head: Normocephalic and atraumatic  Nose: Nose normal       Mouth/Throat:      Mouth: Mucous membranes are moist    Eyes:      Extraocular Movements: Extraocular movements intact  Pupils: Pupils are equal, round, and reactive to light  Cardiovascular:      Rate and Rhythm: Normal rate and regular rhythm  Pulses: Normal pulses  Pulmonary:      Effort: Pulmonary effort is normal       Breath sounds: Normal breath sounds  Abdominal:      General: Bowel sounds are normal       Palpations: Abdomen is soft  Musculoskeletal:      Cervical back: Normal range of motion  Skin:     General: Skin is warm and dry  Capillary Refill: Capillary refill takes less than 2 seconds  Neurological:      General: No focal deficit present  Mental Status: She is alert     Psychiatric:         Mood and Affect: Mood normal

## 2023-03-04 ENCOUNTER — APPOINTMENT (OUTPATIENT)
Dept: LAB | Facility: IMAGING CENTER | Age: 48
End: 2023-03-04

## 2023-03-04 DIAGNOSIS — E66.9 OBESITY (BMI 30.0-34.9): ICD-10-CM

## 2023-03-04 DIAGNOSIS — Z79.899 ENCOUNTER FOR LONG-TERM (CURRENT) USE OF OTHER MEDICATIONS: ICD-10-CM

## 2023-03-04 DIAGNOSIS — I10 ESSENTIAL HYPERTENSION: ICD-10-CM

## 2023-03-04 LAB
ALBUMIN SERPL BCP-MCNC: 3.7 G/DL (ref 3.5–5)
ALP SERPL-CCNC: 83 U/L (ref 46–116)
ALT SERPL W P-5'-P-CCNC: 53 U/L (ref 12–78)
ANION GAP SERPL CALCULATED.3IONS-SCNC: 3 MMOL/L (ref 4–13)
AST SERPL W P-5'-P-CCNC: 32 U/L (ref 5–45)
BILIRUB SERPL-MCNC: 0.3 MG/DL (ref 0.2–1)
BUN SERPL-MCNC: 14 MG/DL (ref 5–25)
CALCIUM SERPL-MCNC: 9.3 MG/DL (ref 8.3–10.1)
CARBAMAZEPINE SERPL-MCNC: 7.9 UG/ML (ref 4–12)
CHLORIDE SERPL-SCNC: 109 MMOL/L (ref 96–108)
CO2 SERPL-SCNC: 28 MMOL/L (ref 21–32)
CREAT SERPL-MCNC: 0.72 MG/DL (ref 0.6–1.3)
ERYTHROCYTE [DISTWIDTH] IN BLOOD BY AUTOMATED COUNT: 12.1 % (ref 11.6–15.1)
GFR SERPL CREATININE-BSD FRML MDRD: 97 ML/MIN/1.73SQ M
GLUCOSE P FAST SERPL-MCNC: 98 MG/DL (ref 65–99)
HCT VFR BLD AUTO: 44.3 % (ref 34.8–46.1)
HGB BLD-MCNC: 14.6 G/DL (ref 11.5–15.4)
MCH RBC QN AUTO: 32.3 PG (ref 26.8–34.3)
MCHC RBC AUTO-ENTMCNC: 33 G/DL (ref 31.4–37.4)
MCV RBC AUTO: 98 FL (ref 82–98)
PLATELET # BLD AUTO: 291 THOUSANDS/UL (ref 149–390)
PMV BLD AUTO: 9.2 FL (ref 8.9–12.7)
POTASSIUM SERPL-SCNC: 4.3 MMOL/L (ref 3.5–5.3)
PROT SERPL-MCNC: 6.8 G/DL (ref 6.4–8.4)
RBC # BLD AUTO: 4.52 MILLION/UL (ref 3.81–5.12)
SODIUM SERPL-SCNC: 140 MMOL/L (ref 135–147)
TSH SERPL DL<=0.05 MIU/L-ACNC: 2.75 UIU/ML (ref 0.45–4.5)
WBC # BLD AUTO: 8.27 THOUSAND/UL (ref 4.31–10.16)

## 2023-03-06 ENCOUNTER — CLINICAL SUPPORT (OUTPATIENT)
Dept: FAMILY MEDICINE CLINIC | Facility: CLINIC | Age: 48
End: 2023-03-06

## 2023-03-06 ENCOUNTER — TELEPHONE (OUTPATIENT)
Dept: FAMILY MEDICINE CLINIC | Facility: CLINIC | Age: 48
End: 2023-03-06

## 2023-03-06 DIAGNOSIS — E88.81 INSULIN RESISTANCE: ICD-10-CM

## 2023-03-06 DIAGNOSIS — E66.9 OBESITY (BMI 30.0-34.9): ICD-10-CM

## 2023-03-06 DIAGNOSIS — I10 BENIGN ESSENTIAL HTN: ICD-10-CM

## 2023-03-06 RX ORDER — AMLODIPINE BESYLATE 5 MG/1
5 TABLET ORAL DAILY
Qty: 90 TABLET | Refills: 0 | Status: SHIPPED | OUTPATIENT
Start: 2023-03-06

## 2023-03-06 NOTE — PROGRESS NOTES
[facility]  Yobany Hathaway     Lee Myers is a 46 y o  female who was referred to the pharmacist for obesity and weight loss education and management, referred by Katya Douglas MD      Telemedicine consent  The patient was identified by name and date of birth  Lee Myers was informed that this is a telemedicine visit and that the visit is being conducted through Telephone  My office door was closed  No one else was in the room  She acknowledged consent and understanding of privacy and security of the video platform  The patient has agreed to participate and understands they can discontinue the visit at any time  Assessment/ Plan      1  Obesity   • Baseline Weight: 239 lbs   • Most recent Weight: 239 lbs   • 5% weight loss goal (to be met at week 12): 227 lbs  • Weight loss: Stage 2 - BMI > 25 with at least one severe complication or requires more aggressive weight loss for effective treatment - add pharmacotherapy with BMI > 27, consider bariatric surgery with BMI > 35 per the AACE/ACE guidelines  • Patient's weight-related disease of complication: HTN, hyperlipidemia, depression   • Medication options/discussion  o Avoid contrave- on bupropion for depression/ anxiety   o GLP-1 agonist would be good option for weight loss  No hx pancreatitis  No personal or family hx MEN2 or MTC  Changes to Medication Regimen: If PCP is in agreement with plan  Initiate Semaglutide [facility]): Titration schedule - 0 25 mg weekly x4 weeks, then 0 5 mg weekly x4 weeks, then 1 mg weekly x4 weeks, then 1 7 mg weekly x4 weeks, then 2 4 mg weekly thereafter as tolerated - If the patient cannot tolerate an increased dose during dose escalation, consider delaying dose escalation for one additional month      A 5% weight loss goal with [facility] is recommended at 12 weeks per AACE/ACE guidelines  If goal is not reached, medication should be discontinued    2   Medication Reconciliation:   • Medication list reviewed with pt at today's visit  • Medication list updated to reflect medications pt is currently taking    3  BMI Counseling There is no height or weight on file to calculate BMI  The BMI is above normal  Nutrition recommendations include reducing portion sizes, decreasing overall calorie intake and 3-5 servings of fruits/vegetables daily  Exercise recommendations include moderate aerobic physical activity for 150 minutes/week  Pharmacotherapy was ordered for patient to aid in weight loss  Monitoring: weight on home scale x 1 / week     Referrals placed at this visit: none    Follow-up: 4 weeks     Subjective        1  Previous weight loss medication  • No medications in past     2  Lifestyle:   • Struggling with weight loss for a few years  Avoids junk food  Tried weight watchers  Also tried [employer] weight loss (healthy core) in the past as well which counted calories  Past year is going through menopause so weight has been yoyo-ing  • Diet Recall:   o Breakfast: 2 cups of coffee skim milk; wheat germ (2 oz) yogurt and fruit and whole oats   o Snack: protein shake (Premier protein) with piece of fruit  o Lunch: chick peas or hummus or peanut butter for protein or will get split pea soup   o Snack: fruit or yogurt or protein bars   o Dinner: breakfast for dinner (veggie omelette) or mixed veggies with eggs; or veggie egg rolls in air fryer  o Beverages: water with lime; avoids juice and soda   • Physical Activity: Currently exercising 3 days per week  Walking on treadmill for 30 min         Objective       ASCVD Risk:  The 10-year ASCVD risk score (Yuan LORENZ, et al , 2019) is: 1 2%    Values used to calculate the score:      Age: 46 years      Sex: Female      Is Non- : No      Diabetic: No      Tobacco smoker: No      Systolic Blood Pressure: 284 mmHg      Is BP treated: Yes      HDL Cholesterol: 66 mg/dL      Total Cholesterol: 199 mg/dL     Vitals:     Wt Readings from Last 5 Encounters:   03/01/23 108 kg (239 lb)   01/31/23 108 kg (238 lb 1 6 oz)   08/24/22 108 kg (237 lb 9 6 oz)   10/28/21 99 3 kg (219 lb)   08/19/21 99 3 kg (219 lb)       BP Readings from Last 3 Encounters:   03/01/23 124/78   08/24/22 126/84   08/19/21 148/74       Pulse Readings from Last 3 Encounters:   03/01/23 68   08/24/22 63   08/19/21 71       Labs:  Lab Results   Component Value Date    SODIUM 140 03/04/2023    K 4 3 03/04/2023     (H) 03/04/2023    CO2 28 03/04/2023    AGAP 3 (L) 03/04/2023    BUN 14 03/04/2023    CREATININE 0 72 03/04/2023    GLUF 98 03/04/2023    CALCIUM 9 3 03/04/2023    AST 32 03/04/2023    ALT 53 03/04/2023    ALKPHOS 83 03/04/2023    TP 6 8 03/04/2023    TBILI 0 30 03/04/2023    EGFR 97 03/04/2023         Pharmacist Tracking Tool       Pharmacist Tracking Tool  Reason For Outreach: Embedded Pharmacist  Demographics:  Intervention Method: Phone  Type of Intervention: New  Topics Addressed: Obesity  Pharmacologic Interventions: Medication Initiation  Non-Pharmacologic Interventions: Care coordination, Disease state education and Medication/Device education  Time:  Direct Patient Care: 45 mins  Care Coordination: 15 mins  Recommendation Recipient: Patient/Caregiver and Provider  Outcome: Accepted

## 2023-03-06 NOTE — TELEPHONE ENCOUNTER
Prior auth submitted through cover my meds KEY [id number] for [facility]    Pharmacist Tracking Tool  Reason For Outreach: Embedded Pharmacist  Demographics:  Intervention Method: Chart Review  Type of Intervention: Follow-Up  Topics Addressed: Obesity  Pharmacologic Interventions: N/A  Non-Pharmacologic Interventions: Care coordination  Time:  Direct Patient Care: 0 mins  Care Coordination: 15 mins  Recommendation Recipient: N/A  Outcome: N/A

## 2023-03-09 ENCOUNTER — TELEPHONE (OUTPATIENT)
Dept: FAMILY MEDICINE CLINIC | Facility: CLINIC | Age: 48
End: 2023-03-09

## 2023-03-09 NOTE — TELEPHONE ENCOUNTER
Prior auth already submitted through cover my meds Key [id number] for [address]  Awaiting response   No further action needed at this time no

## 2023-03-15 ENCOUNTER — TELEPHONE (OUTPATIENT)
Dept: FAMILY MEDICINE CLINIC | Facility: CLINIC | Age: 48
End: 2023-03-15

## 2023-03-15 NOTE — TELEPHONE ENCOUNTER
Patient needs prior auth for Vegovy 0 25mg/0 5ml auto injectors    Bruce: Damaris DOCKERY 1975    Complete the PA and send to plan for approval    Thank you

## 2023-03-25 LAB — COLOGUARD RESULT REPORTABLE: NEGATIVE

## 2023-04-03 ENCOUNTER — CLINICAL SUPPORT (OUTPATIENT)
Dept: FAMILY MEDICINE CLINIC | Facility: CLINIC | Age: 48
End: 2023-04-03

## 2023-04-03 DIAGNOSIS — E66.9 OBESITY (BMI 30.0-34.9): Primary | ICD-10-CM

## 2023-04-03 NOTE — PROGRESS NOTES
[facility]  Pettygene Suki, Pharmacist     Dayan Flores is a 46 y o  female who was referred to the pharmacist for obesity and weight loss education and management, referred by Jose Maurice MD      Telemedicine consent  The patient was identified by name and date of birth  Dayan Flores was informed that this is a telemedicine visit and that the visit is being conducted through Telephone  My office door was closed  No one else was in the room  She acknowledged consent and understanding of privacy and security of the video platform  The patient has agreed to participate and understands they can discontinue the visit at any time  Assessment/ Plan      1  Obesity   • Baseline Weight: 239 lbs   • Most recent Weight: 230 lbs   • 5% weight loss goal (to be met at week 12): 227 lbs  • Weight loss: Stage 2 - BMI > 25 with at least one severe complication or requires more aggressive weight loss for effective treatment - add pharmacotherapy with BMI > 27, consider bariatric surgery with BMI > 35 per the AACE/ACE guidelines  • Patient's weight-related disease of complication: HTN, hyperlipidemia, depression     Changes to Medication Regimen: If PCP is in agreement with plan  · Tolerating Wegovy 0 25 mg weekly  Increase to 0 5 mg once weekly x 4 weeks  Script sent per [employer] agreement  A 5% weight loss goal with [place] [facility] is recommended at 12 weeks per AACE/ACE guidelines  If goal is not reached, medication should be discontinued    2  Medication Reconciliation:   • Medication list reviewed with pt at today's visit  • Medication list updated to reflect medications pt is currently taking    3  BMI Counseling There is no height or weight on file to calculate BMI  The BMI is above normal  Nutrition recommendations include reducing portion sizes, decreasing overall calorie intake and 3-5 servings of fruits/vegetables daily   Exercise recommendations include moderate aerobic physical activity for 150 minutes/week  Pharmacotherapy was ordered for patient to aid in weight loss  Monitoring: weight on home scale x 1 / week     Referrals placed at this visit: none    Follow-up: 4 weeks     Subjective        1  Current medication   · Wegovy 0 25 mg weekly- started on 3/14/23; injects on Tuesdays; denies N/V/D/C  Previous weight loss medication  • No medications in past     2  Lifestyle:   • Since starting medication has a slightly decreased appetite  Eating smaller portion sizes  Trying to have mindful eating  • Struggling with weight loss for a few years  Avoids junk food  Tried weight watchers  Also tried [employer] medical weight loss (healthy core) in the past as well which counted calories  Past year is going through menopause so weight has been yoyo-ing  • Diet Recall:   o Breakfast: 2 cups of coffee skim milk; wheat germ (2 oz) yogurt and fruit and whole oats   o Snack: protein shake (Premier protein) with piece of fruit  o Lunch: chick peas or hummus or peanut butter for protein or will get split pea soup   o Snack: fruit or yogurt or protein bars   o Dinner: breakfast for dinner (veggie omelette) or mixed veggies with eggs; or veggie egg rolls in air fryer  o Beverages: water with lime; avoids juice and soda   • Physical Activity: Currently increased to exercising 4 days per week  Walking on treadmill for 30 min 3 days and then walking outside with dtr around the neighborhood  Objective       ASCVD Risk:  The 10-year ASCVD risk score (uYan LORENZ, et al , 2019) is: 1 2%    Values used to calculate the score:      Age: 46 years      Sex: Female      Is Non- : No      Diabetic: No      Tobacco smoker: No      Systolic Blood Pressure: 654 mmHg      Is BP treated: Yes      HDL Cholesterol: 66 mg/dL      Total Cholesterol: 199 mg/dL     Vitals:     Wt Readings from Last 5 Encounters:   03/01/23 108 kg (239 lb)   01/31/23 108 kg (238 lb 1 6 oz) 08/24/22 108 kg (237 lb 9 6 oz)   10/28/21 99 3 kg (219 lb)   08/19/21 99 3 kg (219 lb)       BP Readings from Last 3 Encounters:   03/01/23 124/78   08/24/22 126/84   08/19/21 148/74       Pulse Readings from Last 3 Encounters:   03/01/23 68   08/24/22 63   08/19/21 71     Weight on home scale:  · Baseline: 238 lbs  · 4/3/23: 230 lbs    Labs:  Lab Results   Component Value Date    SODIUM 140 03/04/2023    K 4 3 03/04/2023     (H) 03/04/2023    CO2 28 03/04/2023    AGAP 3 (L) 03/04/2023    BUN 14 03/04/2023    CREATININE 0 72 03/04/2023    GLUF 98 03/04/2023    CALCIUM 9 3 03/04/2023    AST 32 03/04/2023    ALT 53 03/04/2023    ALKPHOS 83 03/04/2023    TP 6 8 03/04/2023    TBILI 0 30 03/04/2023    EGFR 97 03/04/2023     Pharmacist Tracking Tool       Pharmacist Tracking Tool  Reason For Outreach: Embedded Pharmacist  Demographics:  Intervention Method: Phone  Type of Intervention: Follow-Up  Topics Addressed: Obesity  Pharmacologic Interventions: Dose or Frequency Adjusted  Non-Pharmacologic Interventions: Disease state education, Home Monitoring and Medication/Device education  Time:  Direct Patient Care: 15 mins  Care Coordination: 15 mins  Recommendation Recipient: Patient/Caregiver and Provider  Outcome: Accepted

## 2023-04-06 ENCOUNTER — PATIENT MESSAGE (OUTPATIENT)
Dept: FAMILY MEDICINE CLINIC | Facility: CLINIC | Age: 48
End: 2023-04-06

## 2023-04-06 DIAGNOSIS — Z00.8 HEALTH EXAMINATION IN POPULATION SURVEY: Primary | ICD-10-CM

## 2023-05-01 ENCOUNTER — CLINICAL SUPPORT (OUTPATIENT)
Dept: FAMILY MEDICINE CLINIC | Facility: CLINIC | Age: 48
End: 2023-05-01

## 2023-05-01 DIAGNOSIS — E66.9 OBESITY (BMI 30.0-34.9): Primary | ICD-10-CM

## 2023-05-01 NOTE — PROGRESS NOTES
[facility]  Kim Lockett Pharmacist     Paulette Rocha is a 46 y o  female who was referred to the pharmacist for obesity and weight loss education and management, referred by Kalyani Navarro MD      Telemedicine consent  The patient was identified by name and date of birth  Paulette Rocha was informed that this is a telemedicine visit and that the visit is being conducted through Telephone  My office door was closed  No one else was in the room  She acknowledged consent and understanding of privacy and security of the video platform  The patient has agreed to participate and understands they can discontinue the visit at any time  Assessment/ Plan      1  Obesity    Baseline Weight: 239 lbs    Most recent Weight: 225 lbs    5% weight loss goal (to be met at week 12): 227 lbs   Weight loss: Stage 2 - BMI > 25 with at least one severe complication or requires more aggressive weight loss for effective treatment - add pharmacotherapy with BMI > 27, consider bariatric surgery with BMI > 35 per the AACE/ACE guidelines   Patient's weight-related disease of complication: HTN, hyperlipidemia, depression     Changes to Medication Regimen: If PCP is in agreement with plan  · Tolerating Wegovy 0 5 mg weekly  Increase to 1 0 mg once weekly x 4 weeks  Scripts sent per [employer] agreement  A 5% weight loss goal with [facility] is recommended at 12 weeks per AACE/ACE guidelines  If goal is not reached, medication should be discontinued    2  Medication Reconciliation:    Medication list reviewed with pt at today's visit   Medication list updated to reflect medications pt is currently taking    3  BMI Counseling There is no height or weight on file to calculate BMI  The BMI is above normal  Nutrition recommendations include reducing portion sizes, decreasing overall calorie intake and 3-5 servings of fruits/vegetables daily   Exercise recommendations include moderate aerobic physical activity for 150 minutes/week  Pharmacotherapy was ordered for patient to aid in weight loss  Monitoring: weight on home scale x 1 / week     Referrals placed at this visit: none    Follow-up: 4 weeks via phone     Subjective        1  Current medication   · Wegovy 0 5 mg weekly-  injects on Tuesdays; denies N/V/D/C  Previous weight loss medication   No medications in past     2  Lifestyle:    Since starting medication has a slightly decreased appetite  Eating smaller portion sizes  Trying to have mindful eating   Struggling with weight loss for a few years  Avoids junk food  Tried weight watchers  Also tried [employer] medical weight loss (healthy core) in the past as well which counted calories  Past year is going through menopause so weight has been yoyo-ing   Diet Recall:   o Breakfast: 2 cups of coffee skim milk; wheat germ (2 oz) yogurt and fruit and whole oats   o Snack: protein shake (Premier protein) with piece of fruit  o Lunch: chick peas or hummus or peanut butter for protein or will get split pea soup   o Snack: fruit or yogurt or protein bars   o Dinner: breakfast for dinner (veggie omelette) or mixed veggies with eggs; or veggie egg rolls in air fryer  o Beverages: water with lime; avoids juice and soda    Physical Activity: Currently increased to exercising 4 days per week  Walking on treadmill for 30 min 3 days and then walking outside with dtr around the neighborhood  Objective       ASCVD Risk:  The 10-year ASCVD risk score (Yuan LORENZ, et al , 2019) is: 1 1%    Values used to calculate the score:      Age: 46 years      Sex: Female      Is Non- : No      Diabetic: No      Tobacco smoker: No      Systolic Blood Pressure: 402 mmHg      Is BP treated: Yes      HDL Cholesterol: 61 mg/dL      Total Cholesterol: 172 mg/dL     Vitals:     Wt Readings from Last 5 Encounters:   03/01/23 108 kg (239 lb)   01/31/23 108 kg (238 lb 1 6 oz)   08/24/22 108 kg (237 lb 9 6 oz)   10/28/21 99 3 kg (219 lb)   08/19/21 99 3 kg (219 lb)       BP Readings from Last 3 Encounters:   03/01/23 124/78   08/24/22 126/84   08/19/21 148/74       Pulse Readings from Last 3 Encounters:   03/01/23 68   08/24/22 63   08/19/21 71     Weight on home scale:  · Baseline: 238 lbs  · 4/3/23: 230 lbs  · 5/1/23: 225 lbs     Labs:  Lab Results   Component Value Date    SODIUM 140 03/04/2023    K 4 3 03/04/2023     (H) 03/04/2023    CO2 28 03/04/2023    AGAP 3 (L) 03/04/2023    BUN 14 03/04/2023    CREATININE 0 72 03/04/2023    GLUF 98 03/04/2023    CALCIUM 9 3 03/04/2023    AST 32 03/04/2023    ALT 53 03/04/2023    ALKPHOS 83 03/04/2023    TP 6 8 03/04/2023    TBILI 0 30 03/04/2023    EGFR 97 03/04/2023     Pharmacist Tracking Tool       Pharmacist Tracking Tool  Reason For Outreach: Embedded Pharmacist  Demographics:  Intervention Method: Phone  Type of Intervention: Follow-Up  Topics Addressed: Obesity  Pharmacologic Interventions: Dose or Frequency Adjusted  Non-Pharmacologic Interventions: Disease state education, Home Monitoring and Medication/Device education  Time:  Direct Patient Care: 15 mins  Care Coordination: 15 mins  Recommendation Recipient: Patient/Caregiver and Provider  Outcome: Accepted

## 2023-05-08 DIAGNOSIS — E78.49 OTHER HYPERLIPIDEMIA: ICD-10-CM

## 2023-05-08 RX ORDER — ATORVASTATIN CALCIUM 80 MG/1
80 TABLET, FILM COATED ORAL DAILY
Qty: 90 TABLET | Refills: 1 | Status: SHIPPED | OUTPATIENT
Start: 2023-05-08

## 2023-05-10 ENCOUNTER — LAB REQUISITION (OUTPATIENT)
Dept: LAB | Facility: HOSPITAL | Age: 48
End: 2023-05-10

## 2023-05-10 DIAGNOSIS — Z12.4 ENCOUNTER FOR SCREENING FOR MALIGNANT NEOPLASM OF CERVIX: ICD-10-CM

## 2023-05-11 LAB
HPV HR 12 DNA CVX QL NAA+PROBE: NEGATIVE
HPV16 DNA CVX QL NAA+PROBE: NEGATIVE
HPV18 DNA CVX QL NAA+PROBE: NEGATIVE

## 2023-05-15 DIAGNOSIS — F41.9 ANXIETY: ICD-10-CM

## 2023-05-15 RX ORDER — ALPRAZOLAM 0.25 MG/1
0.25 TABLET ORAL DAILY PRN
Qty: 90 TABLET | Refills: 0 | Status: SHIPPED | OUTPATIENT
Start: 2023-05-15

## 2023-05-16 LAB
LAB AP GYN PRIMARY INTERPRETATION: NORMAL
Lab: NORMAL

## 2023-05-30 ENCOUNTER — CLINICAL SUPPORT (OUTPATIENT)
Dept: FAMILY MEDICINE CLINIC | Facility: CLINIC | Age: 48
End: 2023-05-30

## 2023-05-30 DIAGNOSIS — E66.9 OBESITY (BMI 30.0-34.9): Primary | ICD-10-CM

## 2023-05-30 NOTE — PROGRESS NOTES
[facility]  Nevaeh Ga Pharmacist     Anika Gonzalez is a 46 y o  female who was referred to the pharmacist for obesity and weight loss education and management, referred by Michael Fischer MD      Telemedicine consent  The patient was identified by name and date of birth  Anika Gonzalez was informed that this is a telemedicine visit and that the visit is being conducted through Telephone  My office door was closed  No one else was in the room  She acknowledged consent and understanding of privacy and security of the video platform  The patient has agreed to participate and understands they can discontinue the visit at any time  Assessment/ Plan      1  Obesity   • Baseline Weight: 239 lbs   • Most recent Weight: 214 lbs (home scale)  • 5% weight loss goal (to be met at week 12): 227 lbs  • Weight loss: Stage 2 - BMI > 25 with at least one severe complication or requires more aggressive weight loss for effective treatment - add pharmacotherapy with BMI > 27, consider bariatric surgery with BMI > 35 per the AACE/ACE guidelines  • Patient's weight-related disease of complication: HTN, hyperlipidemia, depression     Changes to Medication Regimen: If PCP is in agreement with plan  · Tolerating Wegovy 1 0 mg weekly  Increase to 1 7 mg once weekly x 4 weeks, then Wegovy 2 4 mg weekly afterwards for maintenance therapy  Scripts sent per [employer] agreement  A 5% weight loss goal with [facility] is recommended at 12 weeks per AACE/ACE guidelines  If goal is not reached, medication should be discontinued    2  Medication Reconciliation:   • Medication list reviewed with pt at today's visit  • Medication list updated to reflect medications pt is currently taking    3  BMI Counseling There is no height or weight on file to calculate BMI   The BMI is above normal  Nutrition recommendations include reducing portion sizes, decreasing overall calorie intake and 3-5 servings of fruits/vegetables daily  Exercise recommendations include moderate aerobic physical activity for 150 minutes/week  Pharmacotherapy was ordered for patient to aid in weight loss  Monitoring: weight on home scale x 1 / week     Referrals placed at this visit: none    Follow-up: as needed with clinical pharmacist  Patient is aware of maternity leave and aware to call the PCP office directly with any concerns or problems  Subjective        1  Current medication   · Wegovy 1 0 mg weekly-  injects on Tuesdays; denies N/V/D/C  Previous weight loss medication  • No medications in past     2  Lifestyle:   • Since starting medication has a slightly decreased appetite  Eating smaller portion sizes  Trying to have mindful eating  • Struggling with weight loss for a few years  Avoids junk food  Tried weight watchers  Also tried [employer] medical weight loss (healthy core) in the past as well which counted calories  Past year is going through menopause so weight has been yoyo-ing  • Diet Recall:   o Breakfast: 2 cups of coffee skim milk; wheat germ (2 oz) yogurt and fruit and whole oats   o Snack: protein shake (Premier protein) with piece of fruit  o Lunch: chick peas or hummus or peanut butter for protein or will get split pea soup   o Snack: fruit or yogurt or protein bars   o Dinner: breakfast for dinner (veggie omelette) or mixed veggies with eggs; or veggie egg rolls in air fryer  o Beverages: water with lime; avoids juice and soda   • Physical Activity: Currently increased to exercising 4 days per week  Walking on treadmill for 30 min 3 days and then walking outside with dtr around the neighborhood         Objective       ASCVD Risk:  The 10-year ASCVD risk score (Yuan LORENZ, et al , 2019) is: 1 1%    Values used to calculate the score:      Age: 46 years      Sex: Female      Is Non- : No      Diabetic: No      Tobacco smoker: No      Systolic Blood Pressure: 929 mmHg      Is BP treated: Yes      HDL Cholesterol: 61 mg/dL      Total Cholesterol: 172 mg/dL     Vitals:     Wt Readings from Last 5 Encounters:   03/01/23 108 kg (239 lb)   01/31/23 108 kg (238 lb 1 6 oz)   08/24/22 108 kg (237 lb 9 6 oz)   10/28/21 99 3 kg (219 lb)   08/19/21 99 3 kg (219 lb)       BP Readings from Last 3 Encounters:   03/01/23 124/78   08/24/22 126/84   08/19/21 148/74       Pulse Readings from Last 3 Encounters:   03/01/23 68   08/24/22 63   08/19/21 71     Weight on home scale:  · Baseline: 238 lbs  · 4/3/23: 230 lbs  · 5/1/23: 225 lbs   · 5/30/23: 214 lbs     Labs:  Lab Results   Component Value Date    AGAP 3 (L) 03/04/2023    ALKPHOS 83 03/04/2023    ALT 53 03/04/2023    AST 32 03/04/2023    BUN 14 03/04/2023    CALCIUM 9 3 03/04/2023     (H) 03/04/2023    CO2 28 03/04/2023    CREATININE 0 72 03/04/2023    EGFR 97 03/04/2023    GLUF 98 03/04/2023    K 4 3 03/04/2023    SODIUM 140 03/04/2023    TBILI 0 30 03/04/2023    TP 6 8 03/04/2023     Pharmacist Tracking Tool       Pharmacist Tracking Tool  Reason For Outreach: Embedded Pharmacist  Demographics:  Intervention Method: Phone  Type of Intervention: Follow-Up  Topics Addressed: Obesity  Pharmacologic Interventions: Dose or Frequency Adjusted  Non-Pharmacologic Interventions: Disease state education, Home Monitoring and Medication/Device education  Time:  Direct Patient Care: 10 mins  Care Coordination: 10 mins  Recommendation Recipient: Patient/Caregiver and Provider  Outcome: Accepted

## 2023-06-04 ENCOUNTER — PATIENT MESSAGE (OUTPATIENT)
Dept: FAMILY MEDICINE CLINIC | Facility: CLINIC | Age: 48
End: 2023-06-04

## 2023-06-04 DIAGNOSIS — I10 BENIGN ESSENTIAL HTN: ICD-10-CM

## 2023-06-06 RX ORDER — AMLODIPINE BESYLATE 5 MG/1
5 TABLET ORAL DAILY
Qty: 90 TABLET | Refills: 3 | Status: SHIPPED | OUTPATIENT
Start: 2023-06-06

## 2023-07-18 ENCOUNTER — HOSPITAL ENCOUNTER (EMERGENCY)
Facility: HOSPITAL | Age: 48
Discharge: HOME | End: 2023-07-18
Attending: EMERGENCY MEDICINE
Payer: COMMERCIAL

## 2023-07-18 VITALS
DIASTOLIC BLOOD PRESSURE: 67 MMHG | OXYGEN SATURATION: 100 % | TEMPERATURE: 98.3 F | SYSTOLIC BLOOD PRESSURE: 141 MMHG | HEART RATE: 82 BPM | RESPIRATION RATE: 17 BRPM

## 2023-07-18 DIAGNOSIS — R11.10 VOMITING: Primary | ICD-10-CM

## 2023-07-18 LAB
ALBUMIN SERPL BCP-MCNC: 4.9 G/DL (ref 3.5–5)
ALP SERPL-CCNC: 75 U/L (ref 34–104)
ALT SERPL W P-5'-P-CCNC: 24 U/L (ref 7–52)
ANION GAP SERPL CALCULATED.3IONS-SCNC: 9 MMOL/L
AST SERPL W P-5'-P-CCNC: 18 U/L (ref 13–39)
BASOPHILS # BLD AUTO: 0.06 THOUSANDS/ÂΜL (ref 0–0.1)
BASOPHILS NFR BLD AUTO: 0 % (ref 0–1)
BILIRUB SERPL-MCNC: 0.67 MG/DL (ref 0.2–1)
BUN SERPL-MCNC: 15 MG/DL (ref 5–25)
CALCIUM SERPL-MCNC: 10 MG/DL (ref 8.4–10.2)
CHLORIDE SERPL-SCNC: 102 MMOL/L (ref 96–108)
CO2 SERPL-SCNC: 26 MMOL/L (ref 21–32)
CREAT SERPL-MCNC: 0.68 MG/DL (ref 0.6–1.3)
EOSINOPHIL # BLD AUTO: 0.05 THOUSAND/ÂΜL (ref 0–0.61)
EOSINOPHIL NFR BLD AUTO: 0 % (ref 0–6)
ERYTHROCYTE [DISTWIDTH] IN BLOOD BY AUTOMATED COUNT: 11.9 % (ref 11.6–15.1)
FLUAV RNA RESP QL NAA+PROBE: NEGATIVE
FLUBV RNA RESP QL NAA+PROBE: NEGATIVE
GFR SERPL CREATININE-BSD FRML MDRD: 102 ML/MIN/1.73SQ M
GLUCOSE SERPL-MCNC: 96 MG/DL (ref 65–140)
HCT VFR BLD AUTO: 46.9 % (ref 34.8–46.1)
HGB BLD-MCNC: 15.8 G/DL (ref 11.5–15.4)
IMM GRANULOCYTES # BLD AUTO: 0.05 THOUSAND/UL (ref 0–0.2)
IMM GRANULOCYTES NFR BLD AUTO: 0 % (ref 0–2)
LIPASE SERPL-CCNC: 25 U/L (ref 11–82)
LYMPHOCYTES # BLD AUTO: 2.4 THOUSANDS/ÂΜL (ref 0.6–4.47)
LYMPHOCYTES NFR BLD AUTO: 18 % (ref 14–44)
MCH RBC QN AUTO: 32.8 PG (ref 26.8–34.3)
MCHC RBC AUTO-ENTMCNC: 33.7 G/DL (ref 31.4–37.4)
MCV RBC AUTO: 97 FL (ref 82–98)
MONOCYTES # BLD AUTO: 0.78 THOUSAND/ÂΜL (ref 0.17–1.22)
MONOCYTES NFR BLD AUTO: 6 % (ref 4–12)
NEUTROPHILS # BLD AUTO: 10.02 THOUSANDS/ÂΜL (ref 1.85–7.62)
NEUTS SEG NFR BLD AUTO: 76 % (ref 43–75)
NRBC BLD AUTO-RTO: 0 /100 WBCS
PLATELET # BLD AUTO: 328 THOUSANDS/UL (ref 149–390)
PMV BLD AUTO: 9 FL (ref 8.9–12.7)
POTASSIUM SERPL-SCNC: 3.7 MMOL/L (ref 3.5–5.3)
PROT SERPL-MCNC: 7.9 G/DL (ref 6.4–8.4)
RBC # BLD AUTO: 4.82 MILLION/UL (ref 3.81–5.12)
RSV RNA RESP QL NAA+PROBE: NEGATIVE
SARS-COV-2 RNA RESP QL NAA+PROBE: NEGATIVE
SODIUM SERPL-SCNC: 137 MMOL/L (ref 135–147)
WBC # BLD AUTO: 13.36 THOUSAND/UL (ref 4.31–10.16)

## 2023-07-18 PROCEDURE — 36415 COLL VENOUS BLD VENIPUNCTURE: CPT | Performed by: EMERGENCY MEDICINE

## 2023-07-18 PROCEDURE — 85025 COMPLETE CBC W/AUTO DIFF WBC: CPT | Performed by: EMERGENCY MEDICINE

## 2023-07-18 PROCEDURE — 80053 COMPREHEN METABOLIC PANEL: CPT | Performed by: EMERGENCY MEDICINE

## 2023-07-18 PROCEDURE — 83690 ASSAY OF LIPASE: CPT | Performed by: EMERGENCY MEDICINE

## 2023-07-18 PROCEDURE — 0241U HB NFCT DS VIR RESP RNA 4 TRGT: CPT | Performed by: EMERGENCY MEDICINE

## 2023-07-18 RX ORDER — ACETAMINOPHEN 325 MG/1
650 TABLET ORAL ONCE
Status: COMPLETED | OUTPATIENT
Start: 2023-07-18 | End: 2023-07-18

## 2023-07-18 RX ORDER — ONDANSETRON 4 MG/1
4 TABLET, ORALLY DISINTEGRATING ORAL EVERY 8 HOURS PRN
Qty: 15 TABLET | Refills: 0 | Status: SHIPPED | OUTPATIENT
Start: 2023-07-18 | End: 2023-07-23

## 2023-07-18 RX ORDER — ONDANSETRON 2 MG/ML
4 INJECTION INTRAMUSCULAR; INTRAVENOUS ONCE
Status: COMPLETED | OUTPATIENT
Start: 2023-07-18 | End: 2023-07-18

## 2023-07-18 RX ADMIN — ACETAMINOPHEN 650 MG: 325 TABLET ORAL at 10:44

## 2023-07-18 RX ADMIN — SODIUM CHLORIDE, SODIUM LACTATE, POTASSIUM CHLORIDE, AND CALCIUM CHLORIDE 2000 ML: .6; .31; .03; .02 INJECTION, SOLUTION INTRAVENOUS at 10:32

## 2023-07-18 RX ADMIN — ONDANSETRON 4 MG: 2 INJECTION INTRAMUSCULAR; INTRAVENOUS at 10:35

## 2023-07-18 NOTE — Clinical Note
Fatuma Marcelo was seen and treated in our emergency department on 7/18/2023. Diagnosis:     Deirdre Brewster  may return to work on return date. She may return on this date: 07/20/2023         If you have any questions or concerns, please don't hesitate to call.       Shelly Fan MD    ______________________________           _______________          _______________  Hospital Representative                              Date                                Time

## 2023-07-18 NOTE — ED PROVIDER NOTES
History  Chief Complaint   Patient presents with   • Vomiting     Patient began with vomiting yesterday early morning. Patient also c/o headache, chills, generalized weakness, and muscle aches. Patient also c/o sweats. Patient also c/I bilateral facial and hand tingling. History provided by:  Patient   used: No    Vomiting  Severity:  Moderate  Duration:  2 days  Timing:  Intermittent  Number of daily episodes:  Several  Quality:  Stomach contents  Able to tolerate:  Liquids  Progression:  Unchanged  Chronicity:  New  Recent urination:  Normal  Context: not post-tussive    Relieved by:  Nothing  Worsened by:  Nothing  Ineffective treatments:  None tried  Associated symptoms: chills and headaches    Associated symptoms: no abdominal pain, no cough, no diarrhea, no fever, no sore throat and no URI    Headaches:     Severity:  Mild    Onset quality:  Gradual    Duration:  1 day    Timing:  Intermittent    Progression:  Waxing and waning    Chronicity:  New  Risk factors: suspect food intake        Prior to Admission Medications   Prescriptions Last Dose Informant Patient Reported? Taking? ALPRAZolam (XANAX) 0.25 mg tablet   No No   Sig: Take 1 tablet (0.25 mg total) by mouth daily as needed for anxiety   Semaglutide-Weight Management (WEGOVY) 1.7 MG/0.75ML   No No   Sig: Inject 0.75 mL (1.7 mg total) under the skin once a week   Semaglutide-Weight Management (WEGOVY) 2.4 MG/0.75ML   No No   Sig: Inject 0.75 mL (2.4 mg total) under the skin once a week Do not start before June 27, 2023.    amLODIPine (NORVASC) 5 mg tablet   No No   Sig: Take 1 tablet (5 mg total) by mouth daily   atenolol (TENORMIN) 25 mg tablet   No No   Sig: Take 1 tablet (25 mg total) by mouth daily   atorvastatin (LIPITOR) 80 mg tablet   No No   Sig: Take 1 tablet (80 mg total) by mouth daily   b complex vitamins tablet   Yes No   Sig: Take 1 tablet by mouth daily   buPROPion (WELLBUTRIN XL) 300 mg 24 hr tablet   Yes No Sig: Take 300 mg by mouth daily   carBAMazepine (CARBATROL) 300 MG 12 hr capsule   Yes No   Sig: Take 300 mg by mouth 2 (two) times a day    cholecalciferol (VITAMIN D3) 1,000 units tablet   Yes No   Sig: Take 2,000 Units by mouth daily   desvenlafaxine succinate (PRISTIQ) 50 mg 24 hr tablet   Yes No   Sig: Take 150 mg by mouth in the morning   losartan (COZAAR) 100 MG tablet   Yes No   Sig: Take 100 mg by mouth in the morning   vitamin B-12 (VITAMIN B-12) 1,000 mcg tablet   Yes No   Sig: Take by mouth daily      Facility-Administered Medications: None       Past Medical History:   Diagnosis Date   • Anxiety    • Depression    • Hyperlipidemia    • Hypertension    • Wears glasses        Past Surgical History:   Procedure Laterality Date   •  SECTION     • KNEE SURGERY Left     torn meniscus repair   • TN HYSTEROSCOPY BX ENDOMETRIUM&/POLYPC W/WO D&C N/A 2021    Procedure: D&C WITH DIAGNOSTIC HYSTEROSCOPY;  Surgeon: Zahida Syed DO;  Location: [facility];  Service: Gynecology   • TN HYSTEROSCOPY ENDOMETRIAL ABLATION N/A 2021    Procedure: ABLATION ENDOMETRIAL RONAK;  Surgeon: Zahida Syed DO;  Location: [facility];  Service: Gynecology       Family History   Problem Relation Age of Onset   • No Known Problems Mother    • Thyroid cancer Father 54   • No Known Problems Daughter    • No Known Problems Maternal Grandmother    • No Known Problems Maternal Grandfather    • Breast cancer Paternal Grandmother 61   • No Known Problems Paternal Grandfather    • No Known Problems Maternal Aunt    • No Known Problems Maternal Aunt    • No Known Problems Maternal Aunt    • No Known Problems Paternal Aunt      I have reviewed and agree with the history as documented.     E-Cigarette/Vaping   • E-Cigarette Use Never User      E-Cigarette/Vaping Substances     Social History     Tobacco Use   • Smoking status: Never   • Smokeless tobacco: Never   Vaping Use   • Vaping Use: Never used   Substance Use Topics   • Alcohol use: Yes     Comment: rarely   • Drug use: Never       Review of Systems   Constitutional: Positive for chills. Negative for fever. HENT: Negative for facial swelling, sore throat and trouble swallowing. Eyes: Negative for pain and visual disturbance. Respiratory: Negative for cough and shortness of breath. Cardiovascular: Negative for chest pain and leg swelling. Gastrointestinal: Positive for vomiting. Negative for abdominal pain, diarrhea and nausea. Genitourinary: Negative for dysuria and flank pain. Musculoskeletal: Negative for back pain, neck pain and neck stiffness. Skin: Negative for pallor and rash. Allergic/Immunologic: Negative for environmental allergies and immunocompromised state. Neurological: Positive for headaches. Negative for dizziness. Hematological: Negative for adenopathy. Does not bruise/bleed easily. Psychiatric/Behavioral: Negative for agitation and behavioral problems. All other systems reviewed and are negative. Physical Exam  Physical Exam  Vitals and nursing note reviewed. Constitutional:       General: She is not in acute distress. Appearance: She is well-developed. HENT:      Head: Normocephalic and atraumatic. Eyes:      Extraocular Movements: Extraocular movements intact. Cardiovascular:      Rate and Rhythm: Normal rate and regular rhythm. Heart sounds: Normal heart sounds. Pulmonary:      Effort: Pulmonary effort is normal. No respiratory distress. Breath sounds: Normal breath sounds. Abdominal:      Palpations: Abdomen is soft. Tenderness: There is no abdominal tenderness. There is no guarding or rebound. Musculoskeletal:         General: Normal range of motion. Cervical back: Normal range of motion and neck supple. Skin:     General: Skin is warm and dry. Neurological:      General: No focal deficit present. Mental Status: She is alert and oriented to person, place, and time. Psychiatric:         Mood and Affect: Mood normal.         Behavior: Behavior normal.         Vital Signs  ED Triage Vitals [07/18/23 0951]   Temperature Pulse Respirations Blood Pressure SpO2   98.3 °F (36.8 °C) 82 18 126/71 100 %      Temp Source Heart Rate Source Patient Position - Orthostatic VS BP Location FiO2 (%)   Oral Monitor Sitting Right arm --      Pain Score       No Pain           Vitals:    07/18/23 0951 07/18/23 1100 07/18/23 1200   BP: 126/71 124/62 141/67   Pulse: 82     Patient Position - Orthostatic VS: Sitting Lying          Visual Acuity  Visual Acuity    Flowsheet Row Most Recent Value   L Pupil Size (mm) 3   R Pupil Size (mm) 3          ED Medications  Medications   lactated ringers bolus 2,000 mL (0 mL Intravenous Stopped 7/18/23 1200)   ondansetron (ZOFRAN) injection 4 mg (4 mg Intravenous Given 7/18/23 1035)   acetaminophen (TYLENOL) tablet 650 mg (650 mg Oral Given 7/18/23 1044)       Diagnostic Studies  Results Reviewed     Procedure Component Value Units Date/Time    FLU/RSV/COVID - if FLU/RSV clinically relevant [[id number]]  (Normal) Collected: 07/18/23 1038    Lab Status: Final result Specimen: Nares from Nose Updated: 07/18/23 1127     SARS-CoV-2 Negative     INFLUENZA A PCR Negative     INFLUENZA B PCR Negative     RSV PCR Negative    Narrative:      FOR PEDIATRIC PATIENTS - copy/paste COVID Guidelines URL to browser: [email or web address]    SARS-CoV-2 assay is a Nucleic Acid Amplification assay intended for the  qualitative detection of nucleic acid from SARS-CoV-2 in nasopharyngeal  swabs. Results are for the presumptive identification of SARS-CoV-2 RNA. Positive results are indicative of infection with SARS-CoV-2, the virus  causing COVID-19, but do not rule out bacterial infection or co-infection  with other viruses.  Laboratories within the [facility] and its  territories are required to report all positive results to the appropriate  public health authorities. Negative results do not preclude SARS-CoV-2  infection and should not be used as the sole basis for treatment or other  patient management decisions. Negative results must be combined with  clinical observations, patient history, and epidemiological information. This test has not been FDA cleared or approved. This test has been authorized by FDA under an Emergency Use Authorization  (EUA). This test is only authorized for the duration of time the  declaration that circumstances exist justifying the authorization of the  emergency use of an in vitro diagnostic tests for detection of SARS-CoV-2  virus and/or diagnosis of COVID-19 infection under section 564(b)(1) of  the Act, 21 U. S.C. 197UAC-7(H)(4), unless the authorization is terminated  or revoked sooner. The test has been validated but independent review by FDA  and CLIA is pending. Test performed using Cepheid GeneXpert: This RT-PCR assay targets N2,  a region unique to SARS-CoV-2. A conserved region in the E-gene was chosen  for pan-Sarbecovirus detection which includes SARS-CoV-2. According to CMS-2020-01-R, this platform meets the definition of high-throughput technology.     Comprehensive metabolic panel [[id number]] Collected: 07/18/23 1038    Lab Status: Final result Specimen: Blood from Arm, Right Updated: 07/18/23 1110     Sodium 137 mmol/L      Potassium 3.7 mmol/L      Chloride 102 mmol/L      CO2 26 mmol/L      ANION GAP 9 mmol/L      BUN 15 mg/dL      Creatinine 0.68 mg/dL      Glucose 96 mg/dL      Calcium 10.0 mg/dL      AST 18 U/L      ALT 24 U/L      Alkaline Phosphatase 75 U/L      Total Protein 7.9 g/dL      Albumin 4.9 g/dL      Total Bilirubin 0.67 mg/dL      eGFR 102 ml/min/1.73sq m     Narrative:      [place] guidelines for Chronic Kidney Disease (CKD):   •  Stage 1 with normal or high GFR (GFR > 90 mL/min/1.73 square meters)  •  Stage 2 Mild CKD (GFR = 60-89 mL/min/1.73 square meters)  •  Stage 3A Moderate CKD (GFR = 45-59 mL/min/1.73 square meters)  •  Stage 3B Moderate CKD (GFR = 30-44 mL/min/1.73 square meters)  •  Stage 4 Severe CKD (GFR = 15-29 mL/min/1.73 square meters)  •  Stage 5 End Stage CKD (GFR <15 mL/min/1.73 square meters)  Note: GFR calculation is accurate only with a steady state creatinine    Lipase [[id number]]  (Normal) Collected: 07/18/23 1038    Lab Status: Final result Specimen: Blood from Arm, Right Updated: 07/18/23 1110     Lipase 25 u/L     CBC and differential [[id number]]  (Abnormal) Collected: 07/18/23 1038    Lab Status: Final result Specimen: Blood from Arm, Right Updated: 07/18/23 1051     WBC 13.36 Thousand/uL      RBC 4.82 Million/uL      Hemoglobin 15.8 g/dL      Hematocrit 46.9 %      MCV 97 fL      MCH 32.8 pg      MCHC 33.7 g/dL      RDW 11.9 %      MPV 9.0 fL      Platelets 410 Thousands/uL      nRBC 0 /100 WBCs      Neutrophils Relative 76 %      Immat GRANS % 0 %      Lymphocytes Relative 18 %      Monocytes Relative 6 %      Eosinophils Relative 0 %      Basophils Relative 0 %      Neutrophils Absolute 10.02 Thousands/µL      Immature Grans Absolute 0.05 Thousand/uL      Lymphocytes Absolute 2.40 Thousands/µL      Monocytes Absolute 0.78 Thousand/µL      Eosinophils Absolute 0.05 Thousand/µL      Basophils Absolute 0.06 Thousands/µL                  No orders to display              Procedures  Procedures         ED Course  ED Course as of 07/21/23 2229 Tue Jul 18, 2023   1117 WBC(!): 13.36   1117 Hemoglobin(!): 15.8   1117 Platelet Count: 861   1117 Sodium: 137   1117 Potassium: 3.7   1117 BUN: 15   1117 Creatinine: 0.68   1117 AST: 18   1117 ALT: 24   1117 Alkaline Phosphatase: 75   1117 Lipase: 25  Labs reviewed, WBC 13.36 otherwise non-acute results, WBC likely related to vomiting. 1143 SARS-COV-2: Negative   1143 INFLU A PCR: Negative   1143 INFLU B PCR: Negative   1143 RSV PCR: Negative  COVID flu RSV negative.    1 [facility] Patient reevaluated, feeling better, no active nausea or vomiting, informed about no significant abnormality on work-up/labs, elevation of white cells likely due to vomiting. We will complete the IV fluids, discharge on Zofran. SBIRT 20yo+    Flowsheet Row Most Recent Value   Initial Alcohol Screen: US AUDIT-C     1. How often do you have a drink containing alcohol? 0 Filed at: 07/18/2023 1000   2. How many drinks containing alcohol do you have on a typical day you are drinking? 0 Filed at: 07/18/2023 1000   3b. FEMALE Any Age, or MALE 65+: How often do you have 4 or more drinks on one occassion? 0 Filed at: 07/18/2023 1000   Audit-C Score 0 Filed at: 07/18/2023 1000   EMILY: How many times in the past year have you. .. Used an illegal drug or used a prescription medication for non-medical reasons? Never Filed at: 07/18/2023 1000                    Medical Decision Making  Patient is a 58-year-old female with history of anxiety, depression, hyperlipidemia, hypertension, comes in with complaints of vomiting, going on for past couple of days, patient states that over the weekend she had dinner with family, feels that it could be something she ate that may have started the symptoms, patient has several episodes of vomiting, no blood, no bile, no abdominal pain, no fever, patient did have chills, also reports nonspecific headaches and muscle aches; no known sick contacts, no chest pain, dyspnea.,  Patient is conscious, alert, vital signs stable, no acute distress, lungs are clear, heart sounds are regular, abdomen is soft, nontender, nondistended. Differential diagnosis: GI viral illness, gastroenteritis, abdominal exam, stable vitals, will check labs to rule out electrolyte abnormalities, give IV fluids, Zofran. Amount and/or Complexity of Data Reviewed  Labs: ordered. Decision-making details documented in ED Course. Risk  OTC drugs.   Prescription drug management. Disposition  Final diagnoses:   Vomiting     Time reflects when diagnosis was documented in both MDM as applicable and the Disposition within this note     Time User Action Codes Description Comment    7/18/2023 10:32 AM Shelly Fan Add [R11.10] Vomiting       ED Disposition     ED Disposition   Discharge    Condition   Stable    Date/Time   Tue Jul 18, 2023 11:43 AM    Comment   Ant Salvador discharge to home/self care.                Follow-up Information     Follow up With Specialties Details Why Contact Info    Ashley Araujo MD Family Medicine Schedule an appointment as soon as possible for a visit   [address] [place] [address]  [phone]            Discharge Medication List as of 7/18/2023 11:47 AM      START taking these medications    Details   ondansetron (ZOFRAN-ODT) 4 mg disintegrating tablet Take 1 tablet (4 mg total) by mouth every 8 (eight) hours as needed for nausea or vomiting for up to 5 days, Starting Tue 7/18/2023, Until Sun 7/23/2023 at 2359, Normal         CONTINUE these medications which have NOT CHANGED    Details   ALPRAZolam (XANAX) 0.25 mg tablet Take 1 tablet (0.25 mg total) by mouth daily as needed for anxiety, Starting Mon 5/15/2023, Normal      amLODIPine (NORVASC) 5 mg tablet Take 1 tablet (5 mg total) by mouth daily, Starting Tue 6/6/2023, Normal      atenolol (TENORMIN) 25 mg tablet Take 1 tablet (25 mg total) by mouth daily, Starting Mon 4/17/2023, Until Tue 4/16/2024, Normal      atorvastatin (LIPITOR) 80 mg tablet Take 1 tablet (80 mg total) by mouth daily, Starting Mon 5/8/2023, Normal      b complex vitamins tablet Take 1 tablet by mouth daily, Historical Med      buPROPion (WELLBUTRIN XL) 300 mg 24 hr tablet Take 300 mg by mouth daily, Starting Thu 8/29/2019, Historical Med      carBAMazepine (CARBATROL) 300 MG 12 hr capsule Take 300 mg by mouth 2 (two) times a day , Starting Fri 8/30/2019, Historical Med      cholecalciferol (VITAMIN D3) 1,000 units tablet Take 2,000 Units by mouth daily, Historical Med      desvenlafaxine succinate (PRISTIQ) 50 mg 24 hr tablet Take 150 mg by mouth in the morning, Starting Fri 7/29/2022, Historical Med      losartan (COZAAR) 100 MG tablet Take 100 mg by mouth in the morning, Starting Mon 8/22/2022, Historical Med      Semaglutide-Weight Management (WEGOVY) 1.7 MG/0.75ML Inject 0.75 mL (1.7 mg total) under the skin once a week, Starting Tue 5/30/2023, Normal      Semaglutide-Weight Management (WEGOVY) 2.4 MG/0.75ML Inject 0.75 mL (2.4 mg total) under the skin once a week Do not start before June 27, 2023., Starting Tue 6/27/2023, Normal      vitamin B-12 (VITAMIN B-12) 1,000 mcg tablet Take by mouth daily, Historical Med             No discharge procedures on file.     PDMP Review       Value Time User    PDMP Reviewed  Yes 5/15/2023 11:46 AM Katia Lui MD          ED Provider  Electronically Signed by           Mahendra Reyes MD  07/21/23 4669

## 2023-07-18 NOTE — Clinical Note
Gabriel Varinder was seen and treated in our emergency department on 7/18/2023. Diagnosis:     Moody Can  may return to work on return date. She may return on this date: 07/20/2023         If you have any questions or concerns, please don't hesitate to call.       Dwayne Rodrigues MD    ______________________________           _______________          _______________  Hospital Representative                              Date                                Time

## 2023-08-01 ENCOUNTER — APPOINTMENT (OUTPATIENT)
Dept: LAB | Facility: IMAGING CENTER | Age: 48
End: 2023-08-01
Payer: COMMERCIAL

## 2023-08-01 DIAGNOSIS — Z79.899 ENCOUNTER FOR LONG-TERM (CURRENT) USE OF OTHER MEDICATIONS: ICD-10-CM

## 2023-08-01 LAB — CARBAMAZEPINE SERPL-MCNC: 8.8 UG/ML (ref 4–12)

## 2023-08-01 PROCEDURE — 80156 ASSAY CARBAMAZEPINE TOTAL: CPT

## 2023-08-01 PROCEDURE — 36415 COLL VENOUS BLD VENIPUNCTURE: CPT

## 2023-08-07 DIAGNOSIS — E66.9 OBESITY (BMI 30.0-34.9): ICD-10-CM

## 2023-08-07 DIAGNOSIS — Z13.220 LIPID SCREENING: Primary | ICD-10-CM

## 2023-08-07 DIAGNOSIS — I10 ESSENTIAL HYPERTENSION: ICD-10-CM

## 2023-08-07 RX ORDER — ATENOLOL 25 MG/1
25 TABLET ORAL DAILY
Qty: 90 TABLET | Refills: 3 | Status: SHIPPED | OUTPATIENT
Start: 2023-08-07 | End: 2024-08-06

## 2023-08-19 ENCOUNTER — APPOINTMENT (OUTPATIENT)
Dept: LAB | Facility: IMAGING CENTER | Age: 48
End: 2023-08-19
Payer: COMMERCIAL

## 2023-08-19 DIAGNOSIS — Z00.8 HEALTH EXAMINATION IN POPULATION SURVEY: ICD-10-CM

## 2023-08-19 DIAGNOSIS — Z13.220 LIPID SCREENING: ICD-10-CM

## 2023-08-19 LAB
CHOLEST SERPL-MCNC: 138 MG/DL
EST. AVERAGE GLUCOSE BLD GHB EST-MCNC: 94 MG/DL
HBA1C MFR BLD: 4.9 %
HDLC SERPL-MCNC: 45 MG/DL
LDLC SERPL CALC-MCNC: 73 MG/DL (ref 0–100)
TRIGL SERPL-MCNC: 99 MG/DL

## 2023-08-19 PROCEDURE — 83036 HEMOGLOBIN GLYCOSYLATED A1C: CPT

## 2023-08-19 PROCEDURE — 36415 COLL VENOUS BLD VENIPUNCTURE: CPT

## 2023-08-19 PROCEDURE — 80061 LIPID PANEL: CPT

## 2023-09-06 ENCOUNTER — OFFICE VISIT (OUTPATIENT)
Dept: FAMILY MEDICINE CLINIC | Facility: CLINIC | Age: 48
End: 2023-09-06
Payer: COMMERCIAL

## 2023-09-06 VITALS
DIASTOLIC BLOOD PRESSURE: 72 MMHG | BODY MASS INDEX: 27.07 KG/M2 | WEIGHT: 193.4 LBS | HEIGHT: 71 IN | HEART RATE: 65 BPM | OXYGEN SATURATION: 98 % | SYSTOLIC BLOOD PRESSURE: 116 MMHG

## 2023-09-06 DIAGNOSIS — I10 BENIGN ESSENTIAL HTN: ICD-10-CM

## 2023-09-06 DIAGNOSIS — Z00.00 ANNUAL PHYSICAL EXAM: Primary | ICD-10-CM

## 2023-09-06 PROCEDURE — 99396 PREV VISIT EST AGE 40-64: CPT | Performed by: FAMILY MEDICINE

## 2023-09-06 RX ORDER — ESTRADIOL AND NORETHINDRONE ACETATE 1; .5 MG/1; MG/1
TABLET ORAL
COMMUNITY
Start: 2023-08-07

## 2023-09-06 RX ORDER — LOSARTAN POTASSIUM 100 MG/1
100 TABLET ORAL DAILY
Qty: 90 TABLET | Refills: 3 | Status: SHIPPED | OUTPATIENT
Start: 2023-09-06

## 2023-09-06 RX ORDER — IVERMECTIN 10 MG/G
CREAM TOPICAL
COMMUNITY
Start: 2023-08-17

## 2023-09-06 RX ORDER — ESTRADIOL AND NORETHINDRONE ACETATE 1; .5 MG/1; MG/1
TABLET ORAL EVERY 24 HOURS
COMMUNITY
End: 2023-09-06

## 2023-09-06 NOTE — PROGRESS NOTES
Assessment/Plan:       1. Annual physical exam  Assessment & Plan:  Reviewed age-appropriate health maintenance and preventive care. 2. Benign essential HTN  -     losartan (COZAAR) 100 MG tablet; Take 1 tablet (100 mg total) by mouth in the morning        Subjective:      Patient ID: Jennifer Johnson is a 46 y.o. female. Patient is here for an annual checkup she is doing very well blood pressures controlled she has lost over 40 pounds with the medication she feels great she does not smoke she drinks rarely she does have a gynecologist she is up-to-date on immunizations and cancer screening. Her meds and labs were reviewed. The following portions of the patient's history were reviewed and updated as appropriate: allergies, current medications, past family history, past medical history, past social history, past surgical history, and problem list.    Review of Systems   Respiratory: Negative. Cardiovascular: Negative. Gastrointestinal: Negative. All other systems reviewed and are negative. Objective:      /72 (BP Location: Left arm, Patient Position: Sitting, Cuff Size: Standard)   Pulse 65   Ht 5' 11" (1.803 m)   Wt 87.7 kg (193 lb 6.4 oz)   LMP 08/24/2021 (Approximate)   SpO2 98%   BMI 26.97 kg/m²          Physical Exam  Vitals and nursing note reviewed. Constitutional:       Appearance: Normal appearance. HENT:      Head: Normocephalic and atraumatic. Nose: Nose normal.      Mouth/Throat:      Mouth: Mucous membranes are moist.   Eyes:      Extraocular Movements: Extraocular movements intact. Pupils: Pupils are equal, round, and reactive to light. Cardiovascular:      Rate and Rhythm: Normal rate and regular rhythm. Pulses: Normal pulses. Pulmonary:      Effort: Pulmonary effort is normal.      Breath sounds: Normal breath sounds. Abdominal:      General: Bowel sounds are normal.      Palpations: Abdomen is soft.    Musculoskeletal:      Cervical back: Normal range of motion. Skin:     General: Skin is warm and dry. Capillary Refill: Capillary refill takes less than 2 seconds. Neurological:      General: No focal deficit present. Mental Status: She is alert.    Psychiatric:         Mood and Affect: Mood normal.

## 2023-09-26 ENCOUNTER — OFFICE VISIT (OUTPATIENT)
Dept: URGENT CARE | Age: 48
End: 2023-09-26
Payer: COMMERCIAL

## 2023-09-26 ENCOUNTER — APPOINTMENT (OUTPATIENT)
Dept: RADIOLOGY | Age: 48
End: 2023-09-26
Payer: COMMERCIAL

## 2023-09-26 VITALS
HEART RATE: 70 BPM | RESPIRATION RATE: 18 BRPM | OXYGEN SATURATION: 99 % | SYSTOLIC BLOOD PRESSURE: 133 MMHG | TEMPERATURE: 97 F | DIASTOLIC BLOOD PRESSURE: 75 MMHG

## 2023-09-26 DIAGNOSIS — S90.129A BRUISED TOE: ICD-10-CM

## 2023-09-26 DIAGNOSIS — S90.129A BRUISED TOE: Primary | ICD-10-CM

## 2023-09-26 PROCEDURE — 99213 OFFICE O/P EST LOW 20 MIN: CPT

## 2023-09-26 PROCEDURE — 73660 X-RAY EXAM OF TOE(S): CPT

## 2023-09-26 NOTE — PROGRESS NOTES
[facility]        NAME: Telma Driscoll is a 46 y.o. female  : 1975    MRN: [id number]  DATE: 2023  TIME: 5:51 PM    Assessment and Plan   Bruised toe [S90.129A]  1. Bruised toe  XR toe left great min 2 views        Buddy tape    Patient Instructions   Preliminary Xray-reading of Left great toe - no acute fracture, pointed out areas I believe is due to arthritis when we reviewed in the office. Radiologist will have final reading- if that is different I will call you or contact you via [employer]. Buddy tape the toes when walking. Can use Pain medication for pain. Ice/heat to toe as needed - 15 minutes every 2 hours. Follow up with PCP if not improving in 5-7 days. Proceed to ER if symptoms worsen. Chief Complaint     Chief Complaint   Patient presents with   • Toe Pain     Pt c/o left great toe pain, which just started today. No injury. No prior injury. Pt notes mild swelling. History of Present Illness       Left great toe pain and discoloration on plantar side proximal phalanx starting today. Reports no injuries past several days. Shoe she is wearing is not new. While waiting for imaging, thought back and she states she did trip 2 days ago and stubbed her toes against the leg of a coffee table. Review of Systems   Review of Systems   Constitutional: Negative for chills and fever. HENT: Negative for ear pain and sore throat. Eyes: Negative for pain and visual disturbance. Respiratory: Negative for cough and shortness of breath. Cardiovascular: Negative for chest pain and palpitations. Gastrointestinal: Negative for abdominal pain and vomiting. Genitourinary: Negative for dysuria and hematuria. Musculoskeletal: Negative for arthralgias and back pain. Skin: Positive for color change (Left great toe). Negative for rash. Neurological: Negative for dizziness, seizures, syncope, weakness and numbness.    All other systems reviewed and are negative.         Current Medications       Current Outpatient Medications:   •  ALPRAZolam (XANAX) 0.25 mg tablet, Take 1 tablet (0.25 mg total) by mouth daily as needed for anxiety, Disp: 90 tablet, Rfl: 0  •  amLODIPine (NORVASC) 5 mg tablet, Take 1 tablet (5 mg total) by mouth daily, Disp: 90 tablet, Rfl: 3  •  atorvastatin (LIPITOR) 80 mg tablet, Take 1 tablet (80 mg total) by mouth daily, Disp: 90 tablet, Rfl: 1  •  b complex vitamins tablet, Take 1 tablet by mouth daily, Disp: , Rfl:   •  buPROPion (WELLBUTRIN XL) 300 mg 24 hr tablet, Take 300 mg by mouth daily, Disp: , Rfl:   •  carBAMazepine (CARBATROL) 300 MG 12 hr capsule, Take 300 mg by mouth 2 (two) times a day , Disp: , Rfl:   •  cholecalciferol (VITAMIN D3) 1,000 units tablet, Take 2,000 Units by mouth daily, Disp: , Rfl:   •  desvenlafaxine succinate (PRISTIQ) 50 mg 24 hr tablet, Take 150 mg by mouth in the morning, Disp: , Rfl:   •  estradiol-norethindrone (ACTIVELLA) 1-0.5 MG per tablet, , Disp: , Rfl:   •  losartan (COZAAR) 100 MG tablet, Take 1 tablet (100 mg total) by mouth in the morning, Disp: 90 tablet, Rfl: 3  •  Semaglutide-Weight Management (WEGOVY) 1.7 MG/0.75ML, Inject 0.75 mL (1.7 mg total) under the skin once a week, Disp: 3 mL, Rfl: 0  •  Soolantra 1 % CREA, , Disp: , Rfl:   •  vitamin B-12 (VITAMIN B-12) 1,000 mcg tablet, Take by mouth daily, Disp: , Rfl:     Current Allergies     Allergies as of 09/26/2023 - Reviewed 09/26/2023   Allergen Reaction Noted   • Penicillins Hives 07/11/2007   • Penicillin g Rash 03/01/2023   • Rofecoxib GI Intolerance and Rash 02/14/2015   • Sulfa antibiotics Rash 02/14/2015   • Sulfamethoxazole-trimethoprim Rash 02/14/2015            The following portions of the patient's history were reviewed and updated as appropriate: allergies, current medications, past family history, past medical history, past social history, past surgical history and problem list.     Past Medical History:   Diagnosis Date   • Anxiety    • Depression    • Hyperlipidemia    • Hypertension    • Wears glasses        Past Surgical History:   Procedure Laterality Date   •  SECTION     • KNEE SURGERY Left     torn meniscus repair   • [place] HYSTEROSCOPY BX ENDOMETRIUM&/POLYPC W/WO D&C N/A 2021    Procedure: D&C WITH DIAGNOSTIC HYSTEROSCOPY;  Surgeon: Luis Enrique Suazo DO;  Location: [facility];  Service: Gynecology   • [place] HYSTEROSCOPY ENDOMETRIAL ABLATION N/A 2021    Procedure: ABLATION ENDOMETRIAL RONAK;  Surgeon: Luis Enrique Suazo DO;  Location[facility];  Service: Gynecology       Family History   Problem Relation Age of Onset   • No Known Problems Mother    • Thyroid cancer Father 54   • No Known Problems Daughter    • No Known Problems Maternal Grandmother    • No Known Problems Maternal Grandfather    • Breast cancer Paternal Grandmother 56   • No Known Problems Paternal Grandfather    • No Known Problems Maternal Aunt    • No Known Problems Maternal Aunt    • No Known Problems Maternal Aunt    • No Known Problems Paternal Aunt          Medications have been verified. Objective   /75   Pulse 70   Temp (!) 97 °F (36.1 °C) (Tympanic)   Resp 18   LMP 2021 (Approximate)   SpO2 99%   Patient's last menstrual period was 2021 (approximate). Physical Exam     Physical Exam  Vitals and nursing note reviewed. Constitutional:       Appearance: Normal appearance. HENT:      Head: Normocephalic and atraumatic. Musculoskeletal:        Feet:    Skin:     General: Skin is warm and dry. Capillary Refill: Capillary refill takes less than 2 seconds. Neurological:      General: No focal deficit present. Mental Status: She is alert and oriented to person, place, and time. Mental status is at baseline. Sensory: No sensory deficit. Motor: No weakness. Psychiatric:         Mood and Affect: Mood normal.         Behavior: Behavior normal.         Thought Content:  Thought content normal.

## 2023-09-26 NOTE — PATIENT INSTRUCTIONS
Preliminary Xray-reading of Left great toe - no acute fracture, pointed out areas I believe is due to arthritis when we reviewed in the office. Radiologist will have final reading- if that is different I will call you or contact you via [employer]. Buddy tape the toes when walking. Can use Pain medication for pain. Ice/heat to toe as needed - 15 minutes every 2 hours. Follow up with PCP if not improving in 5-7 days. Proceed to ER if symptoms worsen.

## 2023-09-28 ENCOUNTER — TELEPHONE (OUTPATIENT)
Dept: FAMILY MEDICINE CLINIC | Facility: CLINIC | Age: 48
End: 2023-09-28

## 2023-10-09 ENCOUNTER — CLINICAL SUPPORT (OUTPATIENT)
Dept: FAMILY MEDICINE CLINIC | Facility: CLINIC | Age: 48
End: 2023-10-09

## 2023-10-09 DIAGNOSIS — E66.9 OBESITY (BMI 30.0-34.9): Primary | ICD-10-CM

## 2023-10-09 RX ORDER — DESVENLAFAXINE 100 MG/1
100 TABLET, EXTENDED RELEASE ORAL EVERY EVENING
COMMUNITY
Start: 2023-09-07

## 2023-10-09 NOTE — PROGRESS NOTES
3   Viridiana DMarcelle Les  Female, 44 year old, 1976  RE: Scheduled   Received: Today   Message Contents   Aicha Evans, RN  Ben Oakes             I printed the letter and sent it!           4 [facility]  Yobany Tate     Barrett Aguilar is a 46 y.o. female who was referred to the pharmacist for obesity and weight loss education and management, referred by Clint Vick MD .    Telemedicine consent  The patient was identified by name and date of birth. Barrett Aguilar was informed that this is a telemedicine visit and that the visit is being conducted through Telephone. My office door was closed. No one else was in the room. She acknowledged consent and understanding of privacy and security of the video platform. The patient has agreed to participate and understands they can discontinue the visit at any time. Assessment/ Plan      1. Obesity   • Baseline Weight: 239 lbs   • Most recent Weight: 185 lb (home scale)  • 5% weight loss goal (to be met at week 12): 227 lbs  • Weight loss: Stage 2 - BMI > 25 with at least one severe complication or requires more aggressive weight loss for effective treatment - add pharmacotherapy with BMI > 27, consider bariatric surgery with BMI > 35 per the AACE/ACE guidelines. • Patient's weight-related disease of complication: HTN, hyperlipidemia, depression     Changes to Medication Regimen: If PCP is in agreement with plan  · Could not tolerate Wegovy 2.4 mg weekly dose due to side effects. She is doing well on the Wegovy 1.7 mg weekly. She did get a prior auth approval for the lower maintenance dose. · Continue Wegovy 1.7 mg weekly. A 5% weight loss goal with [facility]FORT JEF is recommended at 12 weeks per AACE/ACE guidelines. If goal is not reached, medication should be discontinued    2. Medication Reconciliation:   • Medication list reviewed with pt at today's visit. • Medication list updated to reflect medications pt is currently taking    3.  BMI Counseling The BMI is above normal. Nutrition recommendations include reducing portion sizes, decreasing overall calorie intake and 3-5 servings of fruits/vegetables daily. Exercise recommendations include moderate aerobic physical activity for 150 minutes/week. Pharmacotherapy was ordered for patient to aid in weight loss. Monitoring: weight on home scale x 1 / week     Referrals placed at this visit: none    Follow-up: every 12 weeks     Subjective        1. Current medication   · Wegovy 1.7 mg weekly-  injects on Tuesdays; denies N/V/D/C. Had gone up to 2.4 mg week and had side effects. She was very nauseous especially in the morning until lunch. Was throwing up. This has resolved since dropped back down to [facility]FORT JEF 1.7 mg dose. Decreased dose in Sept and has been doing better. Previous weight loss medication  • No medications in past     2. Lifestyle:   o Since starting medication she reports smaller portion sizes, less cravings, able to avoid stress eating. • Physical Activity: Currently increased to exercising 4 days per week. Walking on treadmill for 30 min 3 days and then walking outside with dtr around the neighborhood. Objective     ASCVD Risk:  The 10-year ASCVD risk score (Yuan LORENZ, et al., 2019) is: 1.4%    Values used to calculate the score:      Age: 46 years      Sex: Female      Is Non- : No      Diabetic: No      Tobacco smoker: No      Systolic Blood Pressure: 168 mmHg      Is BP treated: Yes      HDL Cholesterol: 45 mg/dL      Total Cholesterol: 138 mg/dL     Vitals:     Wt Readings from Last 5 Encounters:   09/06/23 87.7 kg (193 lb 6.4 oz)   03/01/23 108 kg (239 lb)   01/31/23 108 kg (238 lb 1.6 oz)   08/24/22 108 kg (237 lb 9.6 oz)   10/28/21 99.3 kg (219 lb)       BP Readings from Last 3 Encounters:   09/26/23 133/75   09/06/23 116/72   07/18/23 141/67       Pulse Readings from Last 3 Encounters:   09/26/23 70   09/06/23 65   07/18/23 82     Weight on home scale:  · Baseline: 238 lbs  · 4/3/23: 230 lbs  · 5/1/23: 225 lbs   · 5/30/23: 214 lbs   · 9/6/23: 193 lbs  · 10/9/23: 185 lbs     Labs:  Lab Results   Component Value Date    SODIUM 137 07/18/2023    K 3.7 07/18/2023     07/18/2023    CO2 26 07/18/2023    AGAP 9 07/18/2023    BUN 15 07/18/2023    CREATININE 0.68 07/18/2023    GLUC 96 07/18/2023    GLUF 98 03/04/2023    CALCIUM 10.0 07/18/2023    AST 18 07/18/2023    ALT 24 07/18/2023    ALKPHOS 75 07/18/2023    TP 7.9 07/18/2023    TBILI 0.67 07/18/2023    EGFR 102 07/18/2023     Pharmacist Tracking Tool       Pharmacist Tracking Tool  Reason For Outreach: Embedded Pharmacist  Demographics:  Intervention Method: Phone  Type of Intervention: Follow-Up  Topics Addressed: Obesity  Pharmacologic Interventions: N/A  Non-Pharmacologic Interventions: Disease state education, Home Monitoring and Medication/Device education  Time:  Direct Patient Care: 15 mins  Care Coordination: 10 mins  Recommendation Recipient: Patient/Caregiver and Provider  Outcome: Accepted

## 2023-10-16 ENCOUNTER — TELEPHONE (OUTPATIENT)
Dept: FAMILY MEDICINE CLINIC | Facility: CLINIC | Age: 48
End: 2023-10-16

## 2023-10-17 DIAGNOSIS — E66.9 OBESITY (BMI 30.0-34.9): ICD-10-CM

## 2023-10-17 RX ORDER — SEMAGLUTIDE 1.7 MG/.75ML
INJECTION, SOLUTION SUBCUTANEOUS
Qty: 9 ML | Refills: 3 | Status: SHIPPED | OUTPATIENT
Start: 2023-10-17

## 2023-10-17 NOTE — TELEPHONE ENCOUNTER
This looks like an old Prior auth request created 27 days ago. Approval letter is already scanned into media on 9/22/23 for approval of Wegovy 1.7 mg weekly through 3/6/24.

## 2023-11-20 DIAGNOSIS — E78.49 OTHER HYPERLIPIDEMIA: ICD-10-CM

## 2023-11-20 DIAGNOSIS — F41.9 ANXIETY: ICD-10-CM

## 2023-11-20 RX ORDER — ATORVASTATIN CALCIUM 80 MG/1
80 TABLET, FILM COATED ORAL DAILY
Qty: 90 TABLET | Refills: 0 | Status: SHIPPED | OUTPATIENT
Start: 2023-11-20

## 2023-11-20 RX ORDER — ALPRAZOLAM 0.25 MG/1
0.25 TABLET ORAL DAILY PRN
Qty: 90 TABLET | Refills: 0 | Status: SHIPPED | OUTPATIENT
Start: 2023-11-20

## 2023-12-13 ENCOUNTER — OFFICE VISIT (OUTPATIENT)
Dept: FAMILY MEDICINE CLINIC | Facility: CLINIC | Age: 48
End: 2023-12-13
Payer: COMMERCIAL

## 2023-12-13 VITALS
WEIGHT: 182.4 LBS | OXYGEN SATURATION: 99 % | HEART RATE: 70 BPM | HEIGHT: 71 IN | BODY MASS INDEX: 25.54 KG/M2 | DIASTOLIC BLOOD PRESSURE: 70 MMHG | SYSTOLIC BLOOD PRESSURE: 120 MMHG

## 2023-12-13 DIAGNOSIS — Z12.31 ENCOUNTER FOR SCREENING MAMMOGRAM FOR MALIGNANT NEOPLASM OF BREAST: ICD-10-CM

## 2023-12-13 DIAGNOSIS — L65.9 ALOPECIA: Primary | ICD-10-CM

## 2023-12-13 PROCEDURE — 99213 OFFICE O/P EST LOW 20 MIN: CPT | Performed by: FAMILY MEDICINE

## 2023-12-13 NOTE — PROGRESS NOTES
Assessment/Plan:       1. Alopecia  Comments:  possibly stress vs hair highlights  hold off on  hair products  tsh wnl  no meds causing this    2. Encounter for screening mammogram for malignant neoplasm of breast  -     Mammo screening bilateral w 3d & cad; Future; Expected date: 01/31/2024          Subjective:      Patient ID: Marito Guardado is a 46 y.o. female. The patient has noticed some diffuse hair shedding for 2 years. It does not seem to be related to any specific new medications or start of medications. She does not have any scalp itching or dandruff or pain in the scalp or rash. No hair products or shampoos that are new. She does get her hair highlighted on a routine basis. She is under more stress recently as she adjusted jobs and has more responsibilities at work. The following portions of the patient's history were reviewed and updated as appropriate: allergies, current medications, past family history, past medical history, past social history, past surgical history, and problem list.    Review of Systems   Respiratory: Negative. Cardiovascular: Negative. Gastrointestinal: Negative. Objective:      /70   Pulse 70   Ht 5' 11" (1.803 m)   Wt 82.7 kg (182 lb 6.4 oz)   LMP 08/24/2021 (Approximate)   SpO2 99%   BMI 25.44 kg/m²          Physical Exam  Vitals and nursing note reviewed. Constitutional:       Appearance: Normal appearance. HENT:      Head: Normocephalic and atraumatic. Nose: Nose normal.      Mouth/Throat:      Mouth: Mucous membranes are moist.   Eyes:      Extraocular Movements: Extraocular movements intact. Pupils: Pupils are equal, round, and reactive to light. Cardiovascular:      Rate and Rhythm: Normal rate and regular rhythm. Pulses: Normal pulses. Pulmonary:      Effort: Pulmonary effort is normal.      Breath sounds: Normal breath sounds.    Abdominal:      General: Bowel sounds are normal.      Palpations: Abdomen is soft.   Musculoskeletal:      Cervical back: Normal range of motion. Skin:     General: Skin is warm and dry. Capillary Refill: Capillary refill takes less than 2 seconds. Neurological:      General: No focal deficit present. Mental Status: She is alert.    Psychiatric:         Mood and Affect: Mood normal.

## 2023-12-19 ENCOUNTER — OFFICE VISIT (OUTPATIENT)
Dept: URGENT CARE | Age: 48
End: 2023-12-19
Payer: COMMERCIAL

## 2023-12-19 VITALS
SYSTOLIC BLOOD PRESSURE: 120 MMHG | DIASTOLIC BLOOD PRESSURE: 82 MMHG | TEMPERATURE: 98 F | HEART RATE: 75 BPM | RESPIRATION RATE: 18 BRPM | OXYGEN SATURATION: 98 %

## 2023-12-19 DIAGNOSIS — J01.41 ACUTE RECURRENT PANSINUSITIS: Primary | ICD-10-CM

## 2023-12-19 PROCEDURE — 99213 OFFICE O/P EST LOW 20 MIN: CPT | Performed by: EMERGENCY MEDICINE

## 2023-12-19 RX ORDER — DOXYCYCLINE 100 MG/1
100 TABLET ORAL 2 TIMES DAILY
Qty: 10 TABLET | Refills: 0 | Status: SHIPPED | OUTPATIENT
Start: 2023-12-19 | End: 2023-12-24

## 2023-12-19 RX ORDER — FLUTICASONE PROPIONATE 50 MCG
2 SPRAY, SUSPENSION (ML) NASAL DAILY
Qty: 15.8 ML | Refills: 1 | Status: SHIPPED | OUTPATIENT
Start: 2023-12-19 | End: 2024-01-18

## 2023-12-19 NOTE — PROGRESS NOTES
[facility]        NAME: Breann Hudson is a 48 y.o. female  : 1975    MRN: [id number]  DATE: 2023  TIME: 8:35 AM    Assessment and Plan   Acute recurrent pansinusitis [J01.41]  1. Acute recurrent pansinusitis  doxycycline (ADOXA) 100 MG tablet    fluticasone (FLONASE) 50 mcg/act nasal spray          -Given persistent left-sided frontal and maxillary sinus pain and pressure after near resolution of other URI-like symptoms, will treat for presumptive bacterial rhinosinusitis  -Patient otherwise afebrile nontoxic-appearing and without focal neurological deficit  -Advised patient if she develops persistent or worsening headache or if she develops recurrent vomiting or focal numbness tingling or weakness in extremities, visual changes or speech changes to proceed immediately to the emergency room, otherwise follow-up with her PCP within the next 3 to 5 days or as otherwise directed  -All questions answered at bedside, patient amenable to plan voiced understanding  Patient Instructions       Follow up with PCP in 3-5 days.  Proceed to  ER if symptoms worsen.    Chief Complaint     Chief Complaint   Patient presents with    Cold Like Symptoms     Patient relates continued sinus pressure, pressure behind left eye, pain in left ear. All started last Thursday. Denies fevers         History of Present Illness       48-year-old female with history of hypertension, migraine headaches, bipolar disorder, hyperlipidemia, anxiety presents with chief complaint of persistent left-sided frontal and maxillary sinus pressure and ear fullness.  Patient states that she began with generalized URI-like symptoms approximately 1 week ago, including postnasal drip, cough and nasal congestion.  She states the symptoms have been improving however her sinus pressure and pain has remained.  She states that she did have 1 episode of nonbloody nonbilious emesis this morning associated with her headache however she states  "this is typical of her prior migraines, and she feels as though her sinus pressure is \"giving me a migraine\".  She denies associated fever, shortness of breath, chest pain, visual changes, speech changes or focal numbness tingling or weakness in her extremities.  Denies vertiginous symptoms.  She states that she did suffer from recurrent sinus infections when she was younger however has not had one in \"some time\".    Sinusitis  This is a recurrent problem. The current episode started in the past 7 days. The problem is unchanged. There has been no fever. Her pain is at a severity of 6/10. The pain is moderate. Associated symptoms include congestion, coughing, ear pain, headaches, sinus pressure and a sore throat. Pertinent negatives include no chills, diaphoresis, hoarse voice, neck pain, shortness of breath, sneezing or swollen glands. Past treatments include nothing.       Review of Systems   Review of Systems   Constitutional:  Negative for activity change, appetite change, chills, diaphoresis, fatigue and fever.   HENT:  Positive for congestion, ear pain, postnasal drip, rhinorrhea, sinus pressure, sinus pain, sore throat and tinnitus. Negative for dental problem, drooling, ear discharge, facial swelling, hearing loss, hoarse voice, mouth sores, nosebleeds, sneezing, trouble swallowing and voice change.    Eyes:  Negative for pain and visual disturbance.   Respiratory:  Positive for cough. Negative for apnea, choking, chest tightness, shortness of breath, wheezing and stridor.    Cardiovascular:  Negative for chest pain and palpitations.   Gastrointestinal:  Negative for abdominal pain and vomiting.   Genitourinary:  Negative for dysuria and hematuria.   Musculoskeletal:  Negative for arthralgias, back pain and neck pain.   Skin:  Negative for color change and rash.   Neurological:  Positive for headaches. Negative for dizziness, tremors, seizures, syncope, facial asymmetry, speech difficulty, weakness, " light-headedness and numbness.   All other systems reviewed and are negative.        Current Medications       Current Outpatient Medications:     ALPRAZolam (XANAX) 0.25 mg tablet, Take 1 tablet (0.25 mg total) by mouth daily as needed for anxiety, Disp: 90 tablet, Rfl: 0    amLODIPine (NORVASC) 5 mg tablet, Take 1 tablet (5 mg total) by mouth daily, Disp: 90 tablet, Rfl: 3    atorvastatin (LIPITOR) 80 mg tablet, Take 1 tablet (80 mg total) by mouth daily, Disp: 90 tablet, Rfl: 0    b complex vitamins tablet, Take 1 tablet by mouth daily, Disp: , Rfl:     buPROPion (WELLBUTRIN XL) 300 mg 24 hr tablet, Take 300 mg by mouth daily, Disp: , Rfl:     carBAMazepine (CARBATROL) 300 MG 12 hr capsule, Take 300 mg by mouth 2 (two) times a day , Disp: , Rfl:     cholecalciferol (VITAMIN D3) 1,000 units tablet, Take 2,000 Units by mouth daily, Disp: , Rfl:     desvenlafaxine (PRISTIQ) 100 mg 24 hr tablet, Take 100 mg by mouth every evening, Disp: , Rfl:     desvenlafaxine succinate (PRISTIQ) 50 mg 24 hr tablet, Take 25 mg by mouth in the morning, Disp: , Rfl:     doxycycline (ADOXA) 100 MG tablet, Take 1 tablet (100 mg total) by mouth 2 (two) times a day for 5 days, Disp: 10 tablet, Rfl: 0    estradiol-norethindrone (ACTIVELLA) 1-0.5 MG per tablet, , Disp: , Rfl:     fluticasone (FLONASE) 50 mcg/act nasal spray, 2 sprays into each nostril daily, Disp: 15.8 mL, Rfl: 1    losartan (COZAAR) 100 MG tablet, Take 1 tablet (100 mg total) by mouth in the morning, Disp: 90 tablet, Rfl: 3    Semaglutide-Weight Management (Wegovy) 1.7 MG/0.75ML, INJECT 1.7MG UNDER THE SKIN ONCE A WEEK, Disp: 9 mL, Rfl: 3    Soolantra 1 % CREA, , Disp: , Rfl:     vitamin B-12 (VITAMIN B-12) 1,000 mcg tablet, Take by mouth daily, Disp: , Rfl:     Current Allergies     Allergies as of 12/19/2023 - Reviewed 12/19/2023   Allergen Reaction Noted    Penicillins Hives 07/11/2007    Penicillin g Rash 03/01/2023    Rofecoxib GI Intolerance and Rash 02/14/2015     Sulfa antibiotics Rash 2015    Sulfamethoxazole-trimethoprim Rash 2015            The following portions of the patient's history were reviewed and updated as appropriate: allergies, current medications, past family history, past medical history, past social history, past surgical history and problem list.     Past Medical History:   Diagnosis Date    Anxiety     Depression     Hyperlipidemia     Hypertension     Wears glasses        Past Surgical History:   Procedure Laterality Date     SECTION      KNEE SURGERY Left     torn meniscus repair    [place] HYSTEROSCOPY BX ENDOMETRIUM&/POLYPC W/WO D&C N/A 2021    Procedure: D&C WITH DIAGNOSTIC HYSTEROSCOPY;  Surgeon: Jenifer Jefferson DO;  Location: [facility];  Service: Gynecology    CA HYSTEROSCOPY ENDOMETRIAL ABLATION N/A 2021    Procedure: ABLATION ENDOMETRIAL RONAK;  Surgeon: Jenifer Jefferson DO;  Location: [facility];  Service: Gynecology       Family History   Problem Relation Age of Onset    No Known Problems Mother     Thyroid cancer Father 55    No Known Problems Daughter     No Known Problems Maternal Grandmother     No Known Problems Maternal Grandfather     Breast cancer Paternal Grandmother 60    No Known Problems Paternal Grandfather     No Known Problems Maternal Aunt     No Known Problems Maternal Aunt     No Known Problems Maternal Aunt     No Known Problems Paternal Aunt          Medications have been verified.        Objective   /82   Pulse 75   Temp 98 °F (36.7 °C)   Resp 18   LMP 2021 (Approximate)   SpO2 98%   Patient's last menstrual period was 2021 (approximate).       Physical Exam     Physical Exam  Vitals and nursing note reviewed.   Constitutional:       General: She is not in acute distress.     Appearance: Normal appearance. She is normal weight. She is not ill-appearing, toxic-appearing or diaphoretic.   HENT:      Head: Normocephalic and atraumatic.      Right Ear: Tympanic membrane, ear  canal and external ear normal. There is no impacted cerumen.      Left Ear: Tympanic membrane, ear canal and external ear normal. There is no impacted cerumen.      Nose: Mucosal edema and congestion present. No nasal tenderness or rhinorrhea.      Right Nostril: No foreign body, epistaxis, septal hematoma or occlusion.      Left Nostril: No foreign body, epistaxis, septal hematoma or occlusion.      Right Turbinates: Enlarged and swollen.      Left Turbinates: Enlarged and swollen.      Right Sinus: Maxillary sinus tenderness and frontal sinus tenderness present.      Left Sinus: Maxillary sinus tenderness and frontal sinus tenderness present.      Mouth/Throat:      Mouth: Mucous membranes are moist.      Pharynx: Posterior oropharyngeal erythema present. No oropharyngeal exudate.   Eyes:      General: No visual field deficit.        Right eye: No discharge.         Left eye: No discharge.      Extraocular Movements: Extraocular movements intact.      Conjunctiva/sclera: Conjunctivae normal.      Pupils: Pupils are equal, round, and reactive to light.   Cardiovascular:      Rate and Rhythm: Regular rhythm.      Pulses: Normal pulses.      Heart sounds: Normal heart sounds.   Pulmonary:      Effort: Pulmonary effort is normal. No respiratory distress.      Breath sounds: Normal breath sounds. No stridor. No wheezing, rhonchi or rales.   Chest:      Chest wall: No tenderness.   Abdominal:      General: There is no distension.      Palpations: There is no mass.      Tenderness: There is no abdominal tenderness. There is no right CVA tenderness, left CVA tenderness, guarding or rebound.      Hernia: No hernia is present.   Musculoskeletal:      Cervical back: Normal range of motion and neck supple.   Skin:     Coloration: Skin is not jaundiced or pale.      Findings: No bruising, erythema, lesion or rash.   Neurological:      General: No focal deficit present.      Mental Status: She is alert and oriented to person,  place, and time.      GCS: GCS eye subscore is 4. GCS verbal subscore is 5. GCS motor subscore is 6.      Cranial Nerves: No cranial nerve deficit, dysarthria or facial asymmetry.      Sensory: Sensation is intact. No sensory deficit.      Motor: No weakness, tremor, atrophy, abnormal muscle tone, seizure activity or pronator drift.      Coordination: Coordination is intact. Coordination normal. Finger-Nose-Finger Test normal.      Gait: Gait is intact. Gait normal.   Psychiatric:         Mood and Affect: Mood normal.         Behavior: Behavior normal.

## 2023-12-19 NOTE — LETTER
December 19, 2023     Patient: Breann Hudson   YOB: 1975   Date of Visit: 12/19/2023       To Whom it May Concern:    Breann Hudson was seen in my clinic on 12/19/2023. She may return to work on 12/20/23 .    If you have any questions or concerns, please don't hesitate to call.         Sincerely,          Ash Kessler,         CC: No Recipients

## 2023-12-20 DIAGNOSIS — R11.2 NAUSEA AND VOMITING, UNSPECIFIED VOMITING TYPE: Primary | ICD-10-CM

## 2023-12-20 RX ORDER — ONDANSETRON 4 MG/1
4 TABLET, FILM COATED ORAL EVERY 8 HOURS PRN
Qty: 30 TABLET | Refills: 0 | Status: SHIPPED | OUTPATIENT
Start: 2023-12-20

## 2024-02-05 ENCOUNTER — CLINICAL SUPPORT (OUTPATIENT)
Dept: FAMILY MEDICINE CLINIC | Facility: CLINIC | Age: 49
End: 2024-02-05

## 2024-02-05 ENCOUNTER — TELEPHONE (OUTPATIENT)
Age: 49
End: 2024-02-05

## 2024-02-05 DIAGNOSIS — E66.9 OBESITY (BMI 30.0-34.9): Primary | ICD-10-CM

## 2024-02-05 NOTE — PROGRESS NOTES
[facility]  Marjan Grissom, Pharmacist     Breann Hudson is a 49 y.o. female who was referred to the pharmacist for obesity and weight loss education and management, referred by Robert Budinetz, MD .    Telemedicine consent  The patient was identified by name and date of birth. Breann Hudson was informed that this is a telemedicine visit and that the visit is being conducted through Telephone.  My office door was closed. No one else was in the room.  She acknowledged consent and understanding of privacy and security of the video platform. The patient has agreed to participate and understands they can discontinue the visit at any time.    Assessment/ Plan      Obesity   Baseline Weight: 239 lbs   Baseline BMI: 33.2   Most recent Weight: 172 lb (home scale)  5% weight loss goal (to be met at week 12): 227 lbs  Weight loss: Stage 2 - BMI > 25 with at least one severe complication or requires more aggressive weight loss for effective treatment - add pharmacotherapy with BMI > 27, consider bariatric surgery with BMI > 35 per the AACE/ACE guidelines.    Patient's weight-related disease of complication: HTN, hyperlipidemia, depression     Changes to Medication Regimen:   If PCP is in agreement with plan  Side effects of Wegovy have resolved since medication was discontinued.   Patient would like to try a lower dosage for maintenance therapy. Plan to restart Wegovy titration and maintain her at a lower dose. She tolerated lower doses without side effects. Medication will need a prior authorization. This would be for continuation of therapy. Starting BMI was 33.2.    Follow-up: TBD based on med availability at pharmacy. She will reach out when she is able to get medication filled.     Subjective        Current medication   None    Previous medication  Wegovy 1.7 mg weekly- experienced N/V while on this dose. Medication was discontinued due to side effects     2. Lifestyle:   Since  "stopping medication does have \"food talk\". Noticed more cravings for sweets which has been difficult to manage.   Physical Activity: Currently increased to exercising 4 days per week. Walking on treadmill for 30 min 3 days and then walking outside with dtr around the neighborhood.       Objective     ASCVD Risk:  The 10-year ASCVD risk score (Yuan LORENZ, et al., 2019) is: 1.3%    Values used to calculate the score:      Age: 49 years      Sex: Female      Is Non- : No      Diabetic: No      Tobacco smoker: No      Systolic Blood Pressure: 120 mmHg      Is BP treated: Yes      HDL Cholesterol: 45 mg/dL      Total Cholesterol: 138 mg/dL     Vitals:    Wt Readings from Last 5 Encounters:   12/13/23 82.7 kg (182 lb 6.4 oz)   09/06/23 87.7 kg (193 lb 6.4 oz)   03/01/23 108 kg (239 lb)   01/31/23 108 kg (238 lb 1.6 oz)   08/24/22 108 kg (237 lb 9.6 oz)       BP Readings from Last 3 Encounters:   12/19/23 120/82   12/13/23 120/70   09/26/23 133/75       Pulse Readings from Last 3 Encounters:   12/19/23 75   12/13/23 70   09/26/23 70     Weight on home scale:  Baseline: 238 lbs  4/3/23: 230 lbs  5/1/23: 225 lbs   5/30/23: 214 lbs   9/6/23: 193 lbs  10/9/23: 185 lbs   12/20/23: 171 lbs   2/5/24: 172.5 lbs     Labs:  Lab Results   Component Value Date    SODIUM 137 07/18/2023    K 3.7 07/18/2023     07/18/2023    CO2 26 07/18/2023    AGAP 9 07/18/2023    BUN 15 07/18/2023    CREATININE 0.68 07/18/2023    GLUC 96 07/18/2023    GLUF 98 03/04/2023    CALCIUM 10.0 07/18/2023    AST 18 07/18/2023    ALT 24 07/18/2023    ALKPHOS 75 07/18/2023    TP 7.9 07/18/2023    TBILI 0.67 07/18/2023    EGFR 102 07/18/2023     Pharmacist Tracking Tool       Pharmacist Tracking Tool  Reason For Outreach: Embedded Pharmacist  Demographics:  Intervention Method: Phone  Type of Intervention: Follow-Up  Topics Addressed: Obesity  Pharmacologic Interventions: Medication Initiation  Non-Pharmacologic Interventions: Disease " state education, Home Monitoring and Medication/Device education  Time:  Direct Patient Care:  15  mins  Care Coordination:  10  mins  Recommendation Recipient: Patient/Caregiver and Provider  Outcome: Accepted

## 2024-02-05 NOTE — TELEPHONE ENCOUNTER
PA for Semaglutide-Weight Management (WEGOVY) 0.25 MG/0.5ML      Submitted via  []CMM-KEY   [[email or web address]-Case ID # [id number]   []Faxed to plan   []Other website   []Phone call Case ID #     Office notes sent, clinical questions answered. Awaiting determination

## 2024-02-06 ENCOUNTER — HOSPITAL ENCOUNTER (OUTPATIENT)
Dept: MAMMOGRAPHY | Facility: MEDICAL CENTER | Age: 49
Discharge: HOME | End: 2024-02-06
Payer: COMMERCIAL

## 2024-02-06 VITALS — WEIGHT: 182.32 LBS | HEIGHT: 71 IN | BODY MASS INDEX: 25.52 KG/M2

## 2024-02-06 DIAGNOSIS — Z12.31 ENCOUNTER FOR SCREENING MAMMOGRAM FOR MALIGNANT NEOPLASM OF BREAST: ICD-10-CM

## 2024-02-06 PROCEDURE — 77067 SCR MAMMO BI INCL CAD: CPT

## 2024-02-06 PROCEDURE — 77063 BREAST TOMOSYNTHESIS BI: CPT

## 2024-02-10 ENCOUNTER — APPOINTMENT (OUTPATIENT)
Dept: LAB | Facility: IMAGING CENTER | Age: 49
End: 2024-02-10
Payer: COMMERCIAL

## 2024-02-10 DIAGNOSIS — R53.83 OTHER FATIGUE: ICD-10-CM

## 2024-02-10 DIAGNOSIS — Z00.8 HEALTH EXAMINATION IN POPULATION SURVEY: ICD-10-CM

## 2024-02-10 DIAGNOSIS — Z79.899 ENCOUNTER FOR LONG-TERM (CURRENT) USE OF OTHER MEDICATIONS: ICD-10-CM

## 2024-02-10 LAB
ALBUMIN SERPL BCP-MCNC: 4 G/DL (ref 3.5–5)
ALP SERPL-CCNC: 56 U/L (ref 34–104)
ALT SERPL W P-5'-P-CCNC: 17 U/L (ref 7–52)
ANION GAP SERPL CALCULATED.3IONS-SCNC: 7 MMOL/L
AST SERPL W P-5'-P-CCNC: 16 U/L (ref 13–39)
BILIRUB SERPL-MCNC: 0.37 MG/DL (ref 0.2–1)
BUN SERPL-MCNC: 11 MG/DL (ref 5–25)
CALCIUM SERPL-MCNC: 9.3 MG/DL (ref 8.4–10.2)
CARBAMAZEPINE SERPL-MCNC: 6 UG/ML (ref 4–12)
CHLORIDE SERPL-SCNC: 103 MMOL/L (ref 96–108)
CHOLEST SERPL-MCNC: 201 MG/DL
CO2 SERPL-SCNC: 31 MMOL/L (ref 21–32)
CREAT SERPL-MCNC: 0.69 MG/DL (ref 0.6–1.3)
ERYTHROCYTE [DISTWIDTH] IN BLOOD BY AUTOMATED COUNT: 11.9 % (ref 11.6–15.1)
GFR SERPL CREATININE-BSD FRML MDRD: 101 ML/MIN/1.73SQ M
GLUCOSE P FAST SERPL-MCNC: 89 MG/DL (ref 65–99)
HCT VFR BLD AUTO: 46.1 % (ref 34.8–46.1)
HDLC SERPL-MCNC: 52 MG/DL
HGB BLD-MCNC: 15.3 G/DL (ref 11.5–15.4)
LDLC SERPL CALC-MCNC: 120 MG/DL (ref 0–100)
MCH RBC QN AUTO: 32.6 PG (ref 26.8–34.3)
MCHC RBC AUTO-ENTMCNC: 33.2 G/DL (ref 31.4–37.4)
MCV RBC AUTO: 98 FL (ref 82–98)
NONHDLC SERPL-MCNC: 149 MG/DL
PLATELET # BLD AUTO: 307 THOUSANDS/UL (ref 149–390)
PMV BLD AUTO: 8.9 FL (ref 8.9–12.7)
POTASSIUM SERPL-SCNC: 4.2 MMOL/L (ref 3.5–5.3)
PROT SERPL-MCNC: 6.6 G/DL (ref 6.4–8.4)
RBC # BLD AUTO: 4.69 MILLION/UL (ref 3.81–5.12)
SODIUM SERPL-SCNC: 141 MMOL/L (ref 135–147)
TRIGL SERPL-MCNC: 144 MG/DL
TSH SERPL DL<=0.05 MIU/L-ACNC: 2.25 UIU/ML (ref 0.45–4.5)
WBC # BLD AUTO: 5.56 THOUSAND/UL (ref 4.31–10.16)

## 2024-02-10 PROCEDURE — 36415 COLL VENOUS BLD VENIPUNCTURE: CPT

## 2024-02-10 PROCEDURE — 85027 COMPLETE CBC AUTOMATED: CPT

## 2024-02-10 PROCEDURE — 80061 LIPID PANEL: CPT

## 2024-02-10 PROCEDURE — 80053 COMPREHEN METABOLIC PANEL: CPT

## 2024-02-10 PROCEDURE — 80156 ASSAY CARBAMAZEPINE TOTAL: CPT

## 2024-02-10 PROCEDURE — 84443 ASSAY THYROID STIM HORMONE: CPT

## 2024-02-14 NOTE — TELEPHONE ENCOUNTER
PA for Semaglutide-Weight Management (WEGOVY) 0.25 MG/0.5ML Denied    Reason:              Message sent to provider pool Yes    Denial letter scanned into Media Yes    Appeal started No

## 2024-02-21 ENCOUNTER — TELEPHONE (OUTPATIENT)
Age: 49
End: 2024-02-21

## 2024-02-21 NOTE — TELEPHONE ENCOUNTER
Patient called to follow up with [employer] message sent to Dr. Budinetz on 2/18 in regards to her Wegovy.

## 2024-03-08 ENCOUNTER — TELEPHONE (OUTPATIENT)
Age: 49
End: 2024-03-08

## 2024-03-08 ENCOUNTER — OFFICE VISIT (OUTPATIENT)
Dept: FAMILY MEDICINE CLINIC | Facility: CLINIC | Age: 49
End: 2024-03-08
Payer: COMMERCIAL

## 2024-03-08 VITALS
OXYGEN SATURATION: 99 % | HEIGHT: 71 IN | DIASTOLIC BLOOD PRESSURE: 80 MMHG | WEIGHT: 189 LBS | BODY MASS INDEX: 26.46 KG/M2 | HEART RATE: 73 BPM | SYSTOLIC BLOOD PRESSURE: 152 MMHG

## 2024-03-08 DIAGNOSIS — I10 ESSENTIAL HYPERTENSION: Primary | ICD-10-CM

## 2024-03-08 DIAGNOSIS — F41.9 ANXIETY: ICD-10-CM

## 2024-03-08 DIAGNOSIS — E66.09 OBESITY DUE TO EXCESS CALORIES WITH SERIOUS COMORBIDITY, UNSPECIFIED CLASSIFICATION: ICD-10-CM

## 2024-03-08 PROCEDURE — 99214 OFFICE O/P EST MOD 30 MIN: CPT | Performed by: FAMILY MEDICINE

## 2024-03-08 RX ORDER — TIRZEPATIDE 2.5 MG/.5ML
2.5 INJECTION, SOLUTION SUBCUTANEOUS WEEKLY
Qty: 2 ML | Refills: 3 | Status: SHIPPED | OUTPATIENT
Start: 2024-03-08 | End: 2024-06-28

## 2024-03-08 NOTE — PROGRESS NOTES
"Assessment/Plan:       1. Essential hypertension  Assessment & Plan:  Continue meds, no changes      2. Anxiety  Assessment & Plan:  Continue meds, no changes      3. Obesity due to excess calories with serious comorbidity, unspecified classification  Comments:  diet/exercise  zepbound ordered  Orders:  -     tirzepatide (Zepbound) 2.5 mg/0.5 mL auto-injector; Inject 0.5 mL (2.5 mg total) under the skin once a week          Subjective:      Patient ID: Breann Hudson is a 49 y.o. female.    Breann is here for a routine f/u.  Unfortunately she has been unable to get wegovy for the past few months.  She has gained 17lbs.  Will try zepbound.  No CP or sob.  BP is elevated for her today.  Will not adjust meds today.  Will focus on weight loss.  She is a bit flushed/stressed today.  Her anxiety and irritability worsened after decreasing the pristiq from 150mg to 100mg.  She is back on 150mg qd  50/100.  She is still seeing Dr abel.        The following portions of the patient's history were reviewed and updated as appropriate: allergies, current medications, past family history, past medical history, past social history, past surgical history, and problem list.    Review of Systems   Respiratory: Negative.     Cardiovascular: Negative.    Gastrointestinal: Negative.          Objective:      /80 (BP Location: Left arm, Patient Position: Sitting, Cuff Size: Large)   Pulse 73   Ht 5' 11\" (1.803 m)   Wt 85.7 kg (189 lb)   LMP 08/24/2021 (Approximate)   SpO2 99%   BMI 26.36 kg/m²          Physical Exam  Vitals and nursing note reviewed.   Constitutional:       Appearance: Normal appearance.   HENT:      Head: Normocephalic and atraumatic.      Nose: Nose normal.      Mouth/Throat:      Mouth: Mucous membranes are moist.   Eyes:      Extraocular Movements: Extraocular movements intact.      Pupils: Pupils are equal, round, and reactive to light.   Cardiovascular:      Rate and Rhythm: Normal rate and regular " rhythm.      Pulses: Normal pulses.   Pulmonary:      Effort: Pulmonary effort is normal.      Breath sounds: Normal breath sounds.   Abdominal:      General: Bowel sounds are normal.      Palpations: Abdomen is soft.   Musculoskeletal:      Cervical back: Normal range of motion.   Skin:     General: Skin is warm and dry.      Capillary Refill: Capillary refill takes less than 2 seconds.   Neurological:      General: No focal deficit present.      Mental Status: She is alert.   Psychiatric:         Mood and Affect: Mood normal.

## 2024-03-08 NOTE — TELEPHONE ENCOUNTER
PA for tirzepatide (Zepbound) 2.5 mg/0.5 mL     Submitted via    [][employer]-KEY   [x][employer]-Case ID # [id number]   []Faxed to plan   []Other website   []Phone call Case ID #     Office notes sent, clinical questions answered. Awaiting determination    Turnaround time for your insurance to make a decision on your Prior Authorization can take 7-21 business days.

## 2024-04-15 ENCOUNTER — TELEPHONE (OUTPATIENT)
Dept: FAMILY MEDICINE CLINIC | Facility: CLINIC | Age: 49
End: 2024-04-15

## 2024-04-15 DIAGNOSIS — E66.9 OBESITY (BMI 30.0-34.9): ICD-10-CM

## 2024-04-15 DIAGNOSIS — E66.09 OBESITY DUE TO EXCESS CALORIES WITH SERIOUS COMORBIDITY, UNSPECIFIED CLASSIFICATION: Primary | ICD-10-CM

## 2024-04-15 RX ORDER — TIRZEPATIDE 5 MG/.5ML
5 INJECTION, SOLUTION SUBCUTANEOUS WEEKLY
Qty: 2 ML | Refills: 1 | Status: SHIPPED | OUTPATIENT
Start: 2024-04-15

## 2024-04-15 NOTE — TELEPHONE ENCOUNTER
"[facility]  Marjan Grissom, Pharmacist     Breann Hudson is a 49 y.o. female who was referred to the pharmacist for obesity and weight loss education and management, referred by Robert Budinetz, MD .    Telemedicine consent  The patient was identified by name and date of birth. Breann Hudson was informed that this is a telemedicine visit and that the visit is being conducted through Telephone.  My office door was closed. No one else was in the room.  She acknowledged consent and understanding of privacy and security of the video platform. The patient has agreed to participate and understands they can discontinue the visit at any time.    Assessment/ Plan      Obesity   Baseline Weight: 239 lbs   Baseline BMI: 33.2   Most recent Weight: 203.5 lb (home scale)  5% weight loss goal (to be met at week 12): 227 lbs  Weight loss: Stage 2 - BMI > 25 with at least one severe complication or requires more aggressive weight loss for effective treatment - add pharmacotherapy with BMI > 27, consider bariatric surgery with BMI > 35 per the AACE/ACE guidelines.    Patient's weight-related disease of complication: HTN, hyperlipidemia, depression     Changes to Medication Regimen:   If PCP is in agreement with plan  Patient just started Zepbound 2.5 mg weekly. Rx sent for next step up dose of Zepbound 5 mg weekly so pharmacy can start ordering medication.     Follow-up: 4 weeks     Subjective        Current medication   Zepbound 2.5 mg - just received from pharmacy     Previous medication  Wegovy 1.7 mg weekly- experienced N/V while on this dose. Medication was discontinued due to side effects     2. Lifestyle:   Since stopping medication does have \"food talk\". Noticed more cravings for sweets which has been difficult to manage.   Physical Activity: Currently increased to exercising 4 days per week. Walking on treadmill for 30 min 3 days and then walking outside with dtr around the " neighborhood.       Objective     ASCVD Risk:  The 10-year ASCVD risk score (Yuan LORENZ, et al., 2019) is: 2.8%    Values used to calculate the score:      Age: 49 years      Sex: Female      Is Non- : No      Diabetic: No      Tobacco smoker: No      Systolic Blood Pressure: 152 mmHg      Is BP treated: Yes      HDL Cholesterol: 52 mg/dL      Total Cholesterol: 201 mg/dL     Vitals:    Wt Readings from Last 5 Encounters:   03/08/24 85.7 kg (189 lb)   02/06/24 82.7 kg (182 lb 5.1 oz)   12/13/23 82.7 kg (182 lb 6.4 oz)   09/06/23 87.7 kg (193 lb 6.4 oz)   03/01/23 108 kg (239 lb)       BP Readings from Last 3 Encounters:   03/08/24 152/80   12/19/23 120/82   12/13/23 120/70       Pulse Readings from Last 3 Encounters:   03/08/24 73   12/19/23 75   12/13/23 70     Weight on home scale:  Baseline: 238 lbs  4/3/23: 230 lbs  5/1/23: 225 lbs   5/30/23: 214 lbs   9/6/23: 193 lbs  10/9/23: 185 lbs   12/20/23: 171 lbs   2/5/24: 172.5 lbs   4/15/24: 203.5 lbs    Labs:  Lab Results   Component Value Date    SODIUM 141 02/10/2024    K 4.2 02/10/2024     02/10/2024    CO2 31 02/10/2024    AGAP 7 02/10/2024    BUN 11 02/10/2024    CREATININE 0.69 02/10/2024    GLUC 96 07/18/2023    GLUF 89 02/10/2024    CALCIUM 9.3 02/10/2024    AST 16 02/10/2024    ALT 17 02/10/2024    ALKPHOS 56 02/10/2024    TP 6.6 02/10/2024    TBILI 0.37 02/10/2024    EGFR 101 02/10/2024     Pharmacist Tracking Tool       Pharmacist Tracking Tool  Reason For Outreach: Embedded Pharmacist  Demographics:  Intervention Method: Phone  Type of Intervention: Follow-Up  Topics Addressed: Obesity  Pharmacologic Interventions: Dose or Frequency Adjusted  Non-Pharmacologic Interventions: Disease state education, Home Monitoring and Medication/Device education  Time:  Direct Patient Care:  15  mins  Care Coordination:  10  mins  Recommendation Recipient: Patient/Caregiver and Provider  Outcome: Accepted

## 2024-04-17 DIAGNOSIS — F41.9 ANXIETY: ICD-10-CM

## 2024-04-17 RX ORDER — ALPRAZOLAM 0.25 MG/1
0.25 TABLET ORAL DAILY PRN
Qty: 90 TABLET | Refills: 0 | Status: SHIPPED | OUTPATIENT
Start: 2024-04-17

## 2024-05-09 ENCOUNTER — TELEPHONE (OUTPATIENT)
Dept: FAMILY MEDICINE CLINIC | Facility: CLINIC | Age: 49
End: 2024-05-09

## 2024-05-09 DIAGNOSIS — E66.9 OBESITY (BMI 30.0-34.9): Primary | ICD-10-CM

## 2024-05-09 RX ORDER — TIRZEPATIDE 7.5 MG/.5ML
7.5 INJECTION, SOLUTION SUBCUTANEOUS WEEKLY
Qty: 2 ML | Refills: 0 | Status: SHIPPED | OUTPATIENT
Start: 2024-05-09

## 2024-05-09 NOTE — TELEPHONE ENCOUNTER
"[facility]  Marjan Grissom, Pharmacist     Breann Hudson is a 49 y.o. female who was referred to the pharmacist for obesity and weight loss education and management, referred by Robert Budinetz, MD .    Assessment/ Plan      Obesity   Baseline Weight: 239 lbs   Baseline BMI: 33.2   Most recent Weight: 205 lb (home scale)  5% weight loss goal (to be met at week 12): 227 lbs  Weight loss: Stage 2 - BMI > 25 with at least one severe complication or requires more aggressive weight loss for effective treatment - add pharmacotherapy with BMI > 27, consider bariatric surgery with BMI > 35 per the AACE/ACE guidelines.    Patient's weight-related disease of complication: HTN, hyperlipidemia, depression     Changes to Medication Regimen:   If PCP is in agreement with plan  Patient just started Zepbound 5 mg weekly. Rx sent for next step up dose of Zepbound 7.5 mg weekly so pharmacy can start ordering medication.     Follow-up: 4 weeks     Subjective        Current medication   Zepbound 5 mg -increased on 5/7/24. Denies side effects at this time. Patient notes less appetite suppression compared to Wegovy. Still having constant eating and food thoughts.     Previous medication  Wegovy 1.7 mg weekly- experienced N/V while on this dose. Medication was discontinued due to side effects     2. Lifestyle:   Since stopping medication does have \"food talk\". Noticed more cravings for sweets which has been difficult to manage.   Physical Activity: Currently increased to exercising 4 days per week. Walking on treadmill for 30 min 3 days and then walking outside with dtr around the neighborhood.       Objective     ASCVD Risk:  The 10-year ASCVD risk score (Yuan DK, et al., 2019) is: 2.8%    Values used to calculate the score:      Age: 49 years      Sex: Female      Is Non- : No      Diabetic: No      Tobacco smoker: No      Systolic Blood Pressure: 152 mmHg      Is BP " treated: Yes      HDL Cholesterol: 52 mg/dL      Total Cholesterol: 201 mg/dL     Vitals:    Wt Readings from Last 5 Encounters:   03/08/24 85.7 kg (189 lb)   02/06/24 82.7 kg (182 lb 5.1 oz)   12/13/23 82.7 kg (182 lb 6.4 oz)   09/06/23 87.7 kg (193 lb 6.4 oz)   03/01/23 108 kg (239 lb)       BP Readings from Last 3 Encounters:   03/08/24 152/80   12/19/23 120/82   12/13/23 120/70       Pulse Readings from Last 3 Encounters:   03/08/24 73   12/19/23 75   12/13/23 70     Weight on home scale:  Baseline: 238 lbs  4/3/23: 230 lbs  5/1/23: 225 lbs   5/30/23: 214 lbs   9/6/23: 193 lbs  10/9/23: 185 lbs   12/20/23: 171 lbs   2/5/24: 172.5 lbs   4/15/24: 203.5 lbs  5/9/24: 205 lbs    Labs:  Lab Results   Component Value Date    SODIUM 141 02/10/2024    K 4.2 02/10/2024     02/10/2024    CO2 31 02/10/2024    AGAP 7 02/10/2024    BUN 11 02/10/2024    CREATININE 0.69 02/10/2024    GLUC 96 07/18/2023    GLUF 89 02/10/2024    CALCIUM 9.3 02/10/2024    AST 16 02/10/2024    ALT 17 02/10/2024    ALKPHOS 56 02/10/2024    TP 6.6 02/10/2024    TBILI 0.37 02/10/2024    EGFR 101 02/10/2024     Pharmacist Tracking Tool       Pharmacist Tracking Tool  Reason For Outreach: Embedded Pharmacist  Demographics:  Intervention Method: BMG Controlshart Message  Type of Intervention: Follow-Up  Topics Addressed: Obesity  Pharmacologic Interventions: Dose or Frequency Adjusted  Non-Pharmacologic Interventions: Disease state education, Home Monitoring and Medication/Device education  Time:  Direct Patient Care:  10  mins  Care Coordination:  10  mins  Recommendation Recipient: Patient/Caregiver and Provider  Outcome: Accepted

## 2024-05-20 DIAGNOSIS — E78.49 OTHER HYPERLIPIDEMIA: ICD-10-CM

## 2024-05-20 RX ORDER — ATORVASTATIN CALCIUM 80 MG/1
80 TABLET, FILM COATED ORAL DAILY
Qty: 90 TABLET | Refills: 1 | Status: SHIPPED | OUTPATIENT
Start: 2024-05-20

## 2024-06-17 ENCOUNTER — TELEPHONE (OUTPATIENT)
Dept: FAMILY MEDICINE CLINIC | Facility: CLINIC | Age: 49
End: 2024-06-17

## 2024-06-17 DIAGNOSIS — E66.9 OBESITY (BMI 30.0-34.9): Primary | ICD-10-CM

## 2024-06-17 RX ORDER — TIRZEPATIDE 10 MG/.5ML
10 INJECTION, SOLUTION SUBCUTANEOUS WEEKLY
Qty: 2 ML | Refills: 0 | Status: SHIPPED | OUTPATIENT
Start: 2024-06-17

## 2024-06-17 NOTE — TELEPHONE ENCOUNTER
"[facility]  Marjan Grissom, Pharmacist     Breann Hudson is a 49 y.o. female who was referred to the pharmacist for obesity and weight loss education and management, referred by Robert Budinetz, MD .    Assessment/ Plan      Obesity   Baseline Weight: 239 lbs   Baseline BMI: 33.2   Most recent Weight: 197 lb (home scale)  5% weight loss goal (to be met at week 12): 227 lbs  Weight loss: Stage 2 - BMI > 25 with at least one severe complication or requires more aggressive weight loss for effective treatment - add pharmacotherapy with BMI > 27, consider bariatric surgery with BMI > 35 per the AACE/ACE guidelines.    Patient's weight-related disease of complication: HTN, hyperlipidemia, depression     Changes to Medication Regimen:   If PCP is in agreement with plan  Patient tolerating Zepbound 7.5 mg weekly. Rx sent for next step up dose of Zepbound 10 mg weekly.    Follow-up: 4 weeks     Subjective        Current medication   Zepbound 7.5 mg -has done 2 injections so far. Denies side effects such as N/V/D.      Previous medication  Wegovy 1.7 mg weekly- experienced N/V while on this dose. Medication was discontinued due to side effects     2. Lifestyle:   Since stopping medication does have \"food talk\". Noticed more cravings for sweets which has been difficult to manage.   Physical Activity: Currently increased to exercising 4 days per week. Walking on treadmill for 30 min 3 days and then walking outside with dtr around the neighborhood.       Objective     ASCVD Risk:  The 10-year ASCVD risk score (Yuan DK, et al., 2019) is: 2.8%    Values used to calculate the score:      Age: 49 years      Sex: Female      Is Non- : No      Diabetic: No      Tobacco smoker: No      Systolic Blood Pressure: 152 mmHg      Is BP treated: Yes      HDL Cholesterol: 52 mg/dL      Total Cholesterol: 201 mg/dL     Vitals:    Wt Readings from Last 5 Encounters:   03/08/24 " 85.7 kg (189 lb)   02/06/24 82.7 kg (182 lb 5.1 oz)   12/13/23 82.7 kg (182 lb 6.4 oz)   09/06/23 87.7 kg (193 lb 6.4 oz)   03/01/23 108 kg (239 lb)       BP Readings from Last 3 Encounters:   03/08/24 152/80   12/19/23 120/82   12/13/23 120/70       Pulse Readings from Last 3 Encounters:   03/08/24 73   12/19/23 75   12/13/23 70     Weight on home scale:  Baseline: 238 lbs  4/3/23: 230 lbs  5/1/23: 225 lbs   5/30/23: 214 lbs   9/6/23: 193 lbs  10/9/23: 185 lbs   12/20/23: 171 lbs   2/5/24: 172.5 lbs   4/15/24: 203.5 lbs  5/9/24: 205 lbs  6/13/24: 197 lbs    Labs:  Lab Results   Component Value Date    SODIUM 141 02/10/2024    K 4.2 02/10/2024     02/10/2024    CO2 31 02/10/2024    AGAP 7 02/10/2024    BUN 11 02/10/2024    CREATININE 0.69 02/10/2024    GLUC 96 07/18/2023    GLUF 89 02/10/2024    CALCIUM 9.3 02/10/2024    AST 16 02/10/2024    ALT 17 02/10/2024    ALKPHOS 56 02/10/2024    TP 6.6 02/10/2024    TBILI 0.37 02/10/2024    EGFR 101 02/10/2024     Pharmacist Tracking Tool       Pharmacist Tracking Tool  Reason For Outreach: Embedded Pharmacist  Demographics:  Intervention Method: Blink Messengerhart Message  Type of Intervention: Follow-Up  Topics Addressed: Obesity  Pharmacologic Interventions: Dose or Frequency Adjusted  Non-Pharmacologic Interventions: Disease state education, Home Monitoring and Medication/Device education  Time:  Direct Patient Care:  10  mins  Care Coordination:  10  mins  Recommendation Recipient: Patient/Caregiver and Provider  Outcome: Accepted

## 2024-06-21 ENCOUNTER — APPOINTMENT (OUTPATIENT)
Dept: LAB | Age: 49
End: 2024-06-21

## 2024-06-21 DIAGNOSIS — Z00.8 HEALTH EXAMINATION IN POPULATION SURVEY: ICD-10-CM

## 2024-06-21 LAB
CHOLEST SERPL-MCNC: 140 MG/DL
EST. AVERAGE GLUCOSE BLD GHB EST-MCNC: 85 MG/DL
HBA1C MFR BLD: 4.6 %
HDLC SERPL-MCNC: 54 MG/DL
LDLC SERPL CALC-MCNC: 64 MG/DL (ref 0–100)
NONHDLC SERPL-MCNC: 86 MG/DL
TRIGL SERPL-MCNC: 110 MG/DL

## 2024-06-21 PROCEDURE — 80061 LIPID PANEL: CPT

## 2024-06-21 PROCEDURE — 36415 COLL VENOUS BLD VENIPUNCTURE: CPT

## 2024-06-21 PROCEDURE — 83036 HEMOGLOBIN GLYCOSYLATED A1C: CPT

## 2024-07-01 DIAGNOSIS — I10 BENIGN ESSENTIAL HTN: ICD-10-CM

## 2024-07-01 RX ORDER — AMLODIPINE BESYLATE 5 MG/1
5 TABLET ORAL DAILY
Qty: 90 TABLET | Refills: 1 | Status: SHIPPED | OUTPATIENT
Start: 2024-07-01

## 2024-07-10 ENCOUNTER — TELEPHONE (OUTPATIENT)
Dept: FAMILY MEDICINE CLINIC | Facility: CLINIC | Age: 49
End: 2024-07-10

## 2024-07-10 DIAGNOSIS — E66.9 OBESITY (BMI 30.0-34.9): Primary | ICD-10-CM

## 2024-07-11 RX ORDER — TIRZEPATIDE 12.5 MG/.5ML
12.5 INJECTION, SOLUTION SUBCUTANEOUS WEEKLY
Qty: 2 ML | Refills: 0 | Status: SHIPPED | OUTPATIENT
Start: 2024-07-11

## 2024-07-11 NOTE — TELEPHONE ENCOUNTER
"[facility]  Marjan Grissom, Pharmacist     Breann Hudson is a 49 y.o. female who was referred to the pharmacist for obesity and weight loss education and management, referred by Robert Budinetz, MD .    Assessment/ Plan      Obesity   Baseline Weight: 239 lbs   Baseline BMI: 33.2   Most recent Weight: 187.5 lb (home scale)  5% weight loss goal (to be met at week 12): 227 lbs  Weight loss: Stage 2 - BMI > 25 with at least one severe complication or requires more aggressive weight loss for effective treatment - add pharmacotherapy with BMI > 27, consider bariatric surgery with BMI > 35 per the AACE/ACE guidelines.    Patient's weight-related disease of complication: HTN, hyperlipidemia, depression     Changes to Medication Regimen:   If PCP is in agreement with plan  Patient tolerating Zepbound 10 mg weekly. Rx sent for next step up dose of Zepbound 12.5 mg weekly.    Follow-up: 4 weeks     Subjective        Current medication   Zepbound 10 mg -Denies side effects such as N/V/D.  Per patient \"now noticed a change in my eating habits, not craving to eat out of control and when I do eat, my portions are smaller. And I have no side effects at all\"    Previous medication  Wegovy 1.7 mg weekly- experienced N/V while on this dose. Medication was discontinued due to side effects     2. Lifestyle:   Since stopping medication does have \"food talk\". Noticed more cravings for sweets which has been difficult to manage.   Physical Activity: Currently increased to exercising 4 days per week. Walking on treadmill for 30 min 3 days and then walking outside with dtr around the neighborhood.       Objective     ASCVD Risk:  The 10-year ASCVD risk score (Yuan DK, et al., 2019) is: 1.7%    Values used to calculate the score:      Age: 49 years      Sex: Female      Is Non- : No      Diabetic: No      Tobacco smoker: No      Systolic Blood Pressure: 152 mmHg      Is BP " treated: Yes      HDL Cholesterol: 54 mg/dL      Total Cholesterol: 140 mg/dL     Vitals:    Wt Readings from Last 5 Encounters:   03/08/24 85.7 kg (189 lb)   02/06/24 82.7 kg (182 lb 5.1 oz)   12/13/23 82.7 kg (182 lb 6.4 oz)   09/06/23 87.7 kg (193 lb 6.4 oz)   03/01/23 108 kg (239 lb)       BP Readings from Last 3 Encounters:   03/08/24 152/80   12/19/23 120/82   12/13/23 120/70       Pulse Readings from Last 3 Encounters:   03/08/24 73   12/19/23 75   12/13/23 70     Weight on home scale:  Baseline: 238 lbs  4/3/23: 230 lbs  5/1/23: 225 lbs   5/30/23: 214 lbs   9/6/23: 193 lbs  10/9/23: 185 lbs   12/20/23: 171 lbs   2/5/24: 172.5 lbs   4/15/24: 203.5 lbs  5/9/24: 205 lbs  6/13/24: 197 lbs  7/11/24: 187.5 lbs    Labs:  Lab Results   Component Value Date    SODIUM 141 02/10/2024    K 4.2 02/10/2024     02/10/2024    CO2 31 02/10/2024    AGAP 7 02/10/2024    BUN 11 02/10/2024    CREATININE 0.69 02/10/2024    GLUC 96 07/18/2023    GLUF 89 02/10/2024    CALCIUM 9.3 02/10/2024    AST 16 02/10/2024    ALT 17 02/10/2024    ALKPHOS 56 02/10/2024    TP 6.6 02/10/2024    TBILI 0.37 02/10/2024    EGFR 101 02/10/2024     Pharmacist Tracking Tool       Pharmacist Tracking Tool  Reason For Outreach: Embedded Pharmacist  Demographics:  Intervention Method: Silent Herdsmanhart Message  Type of Intervention: Follow-Up  Topics Addressed: Obesity  Pharmacologic Interventions: Dose or Frequency Adjusted  Non-Pharmacologic Interventions: Disease state education, Home Monitoring and Medication/Device education  Time:  Direct Patient Care:  10  mins  Care Coordination:  10  mins  Recommendation Recipient: Patient/Caregiver and Provider  Outcome: Accepted

## 2024-07-18 ENCOUNTER — OFFICE VISIT (OUTPATIENT)
Dept: FAMILY MEDICINE CLINIC | Facility: CLINIC | Age: 49
End: 2024-07-18
Payer: COMMERCIAL

## 2024-07-18 VITALS
HEART RATE: 78 BPM | BODY MASS INDEX: 26.32 KG/M2 | SYSTOLIC BLOOD PRESSURE: 116 MMHG | WEIGHT: 188 LBS | DIASTOLIC BLOOD PRESSURE: 76 MMHG | OXYGEN SATURATION: 98 % | HEIGHT: 71 IN

## 2024-07-18 DIAGNOSIS — F41.9 ANXIETY: ICD-10-CM

## 2024-07-18 DIAGNOSIS — I10 ESSENTIAL HYPERTENSION: Primary | ICD-10-CM

## 2024-07-18 DIAGNOSIS — E78.2 MIXED HYPERLIPIDEMIA: ICD-10-CM

## 2024-07-18 PROCEDURE — 99214 OFFICE O/P EST MOD 30 MIN: CPT | Performed by: FAMILY MEDICINE

## 2024-07-18 NOTE — PROGRESS NOTES
"Assessment/Plan:       1. Essential hypertension  Assessment & Plan:  Continue meds no changes  2. Anxiety  Assessment & Plan:  Continue meds no changes  3. Mixed hyperlipidemia  Assessment & Plan:  Continue meds no changes        Subjective:      Patient ID: Breann Hudson is a 49 y.o. female.    Breann is here for a routine f/u.  She is losing weight on zepbound and doing well without side effects.  No concerns today.  BP is well controlled.  No CP or sob.  Cholesterol came down significantly.  Her mood is stable.        The following portions of the patient's history were reviewed and updated as appropriate: allergies, current medications, past family history, past medical history, past social history, past surgical history, and problem list.    Review of Systems   Respiratory: Negative.     Cardiovascular: Negative.    Gastrointestinal: Negative.          Objective:      /76 (BP Location: Right arm, Patient Position: Sitting, Cuff Size: Standard)   Pulse 78   Ht 5' 11\" (1.803 m)   Wt 85.3 kg (188 lb)   LMP 08/24/2021 (Approximate)   SpO2 98%   BMI 26.22 kg/m²          Physical Exam  Vitals and nursing note reviewed.   Constitutional:       Appearance: Normal appearance.   HENT:      Head: Normocephalic and atraumatic.      Nose: Nose normal.      Mouth/Throat:      Mouth: Mucous membranes are moist.   Eyes:      Extraocular Movements: Extraocular movements intact.      Pupils: Pupils are equal, round, and reactive to light.   Cardiovascular:      Rate and Rhythm: Normal rate and regular rhythm.      Pulses: Normal pulses.   Pulmonary:      Effort: Pulmonary effort is normal.      Breath sounds: Normal breath sounds.   Abdominal:      General: Bowel sounds are normal.      Palpations: Abdomen is soft.   Musculoskeletal:      Cervical back: Normal range of motion.   Skin:     General: Skin is warm and dry.      Capillary Refill: Capillary refill takes less than 2 seconds.   Neurological:      General: " No focal deficit present.      Mental Status: She is alert.   Psychiatric:         Mood and Affect: Mood normal.

## 2024-08-01 NOTE — TELEPHONE ENCOUNTER
Received fax from [facility] requesting prior authorization for pts Wegovy 1.7MG/0.75ML Auto injectors.   : 1975  Key: [id number]

## 2024-08-09 ENCOUNTER — TELEPHONE (OUTPATIENT)
Dept: FAMILY MEDICINE CLINIC | Facility: CLINIC | Age: 49
End: 2024-08-09

## 2024-08-09 DIAGNOSIS — E66.9 OBESITY (BMI 30.0-34.9): Primary | ICD-10-CM

## 2024-08-09 RX ORDER — TIRZEPATIDE 15 MG/.5ML
15 INJECTION, SOLUTION SUBCUTANEOUS WEEKLY
Qty: 2 ML | Refills: 5 | Status: SHIPPED | OUTPATIENT
Start: 2024-08-09

## 2024-08-09 NOTE — TELEPHONE ENCOUNTER
[facility]  Marjan Grissom, Pharmacist     Breann Hudson is a 49 y.o. female who was referred to the pharmacist for obesity and weight loss education and management, referred by Robert Budinetz, MD .    Assessment/ Plan      Obesity   Baseline Weight: 239 lbs   Baseline BMI: 33.2   Most recent Weight: 180 lb (home scale)  5% weight loss goal (to be met at week 12): 227 lbs  Weight loss: Stage 2 - BMI > 25 with at least one severe complication or requires more aggressive weight loss for effective treatment - add pharmacotherapy with BMI > 27, consider bariatric surgery with BMI > 35 per the AACE/ACE guidelines.    Patient's weight-related disease of complication: HTN, hyperlipidemia, depression     Changes to Medication Regimen:   If PCP is in agreement with plan  Patient tolerating Zepbound 12.5 mg weekly. Rx sent for next step up dose of Zepbound 15 mg weekly.    Follow-up: 4 weeks     Subjective        Current medication   Zepbound 12.5 mg -Denies side effects such as N/V/D.  Denies constipation. Denies abdominal pain    Previous medication  Wegovy 1.7 mg weekly- experienced N/V while on this dose. Medication was discontinued due to side effects     2. Lifestyle:   Without medication notices food talk. While on Zepbound appetite is suppressed. Eating smaller portions. Less cravings  Physical Activity: Currently increased to exercising 4 days per week. Walking on treadmill for 30 min 3 days and then walking outside with dtr around the neighborhood.       Objective     ASCVD Risk:  The 10-year ASCVD risk score (Yuan LORENZ, et al., 2019) is: 1%    Values used to calculate the score:      Age: 49 years      Sex: Female      Is Non- : No      Diabetic: No      Tobacco smoker: No      Systolic Blood Pressure: 116 mmHg      Is BP treated: Yes      HDL Cholesterol: 54 mg/dL      Total Cholesterol: 140 mg/dL     Vitals:    Wt Readings from Last 5 Encounters:    07/18/24 85.3 kg (188 lb)   03/08/24 85.7 kg (189 lb)   02/06/24 82.7 kg (182 lb 5.1 oz)   12/13/23 82.7 kg (182 lb 6.4 oz)   09/06/23 87.7 kg (193 lb 6.4 oz)       BP Readings from Last 3 Encounters:   07/18/24 116/76   03/08/24 152/80   12/19/23 120/82       Pulse Readings from Last 3 Encounters:   07/18/24 78   03/08/24 73   12/19/23 75     Weight on home scale:  Baseline: 238 lbs  4/3/23: 230 lbs  5/1/23: 225 lbs   5/30/23: 214 lbs   9/6/23: 193 lbs  10/9/23: 185 lbs   12/20/23: 171 lbs   2/5/24: 172.5 lbs   4/15/24: 203.5 lbs  5/9/24: 205 lbs  6/13/24: 197 lbs  7/11/24: 187.5 lbs  8/9/24: 180 lbs     Labs:  Lab Results   Component Value Date    SODIUM 141 02/10/2024    K 4.2 02/10/2024     02/10/2024    CO2 31 02/10/2024    AGAP 7 02/10/2024    BUN 11 02/10/2024    CREATININE 0.69 02/10/2024    GLUC 96 07/18/2023    GLUF 89 02/10/2024    CALCIUM 9.3 02/10/2024    AST 16 02/10/2024    ALT 17 02/10/2024    ALKPHOS 56 02/10/2024    TP 6.6 02/10/2024    TBILI 0.37 02/10/2024    EGFR 101 02/10/2024     Pharmacist Tracking Tool       Pharmacist Tracking Tool  Reason For Outreach: Embedded Pharmacist  Demographics:  Intervention Method: [employer] Message  Type of Intervention: Follow-Up  Topics Addressed: Obesity  Pharmacologic Interventions: Dose or Frequency Adjusted  Non-Pharmacologic Interventions: Disease state education, Home Monitoring and Medication/Device education  Time:  Direct Patient Care:  10  mins  Care Coordination:  10  mins  Recommendation Recipient: Patient/Caregiver and Provider  Outcome: Accepted

## 2024-09-04 DIAGNOSIS — F41.9 ANXIETY: ICD-10-CM

## 2024-09-04 RX ORDER — ALPRAZOLAM 0.25 MG
0.25 TABLET ORAL DAILY PRN
Qty: 90 TABLET | Refills: 0 | Status: SHIPPED | OUTPATIENT
Start: 2024-09-04

## 2024-09-06 ENCOUNTER — TELEPHONE (OUTPATIENT)
Dept: FAMILY MEDICINE CLINIC | Facility: CLINIC | Age: 49
End: 2024-09-06

## 2024-09-06 DIAGNOSIS — E78.49 OTHER HYPERLIPIDEMIA: ICD-10-CM

## 2024-09-06 RX ORDER — ATORVASTATIN CALCIUM 80 MG/1
80 TABLET, FILM COATED ORAL DAILY
Qty: 90 TABLET | Refills: 1 | Status: SHIPPED | OUTPATIENT
Start: 2024-09-06

## 2024-09-06 NOTE — TELEPHONE ENCOUNTER
[facility]  Mrajan Grissom, Pharmacist     Breann Hudson is a 49 y.o. female who was referred to the pharmacist for obesity and weight loss education and management, referred by Robert Budinetz, MD .    Assessment/ Plan      Obesity   Baseline Weight: 239 lbs   Baseline BMI: 33.2   Most recent Weight: 176 lb (home scale)  % total body weight lost from baseline: 26%  Weight loss: Stage 2 - BMI > 25 with at least one severe complication or requires more aggressive weight loss for effective treatment - add pharmacotherapy with BMI > 27, consider bariatric surgery with BMI > 35 per the AACE/ACE guidelines.    Patient's weight-related disease of complication: HTN, hyperlipidemia, depression     Changes to Medication Regimen:   If PCP is in agreement with plan  Patient tolerating Zepbound 15 mg weekly.  Continue current dose.    Follow-up: As needed with clinical pharmacist  Subjective        Current medication   Zepbound 15 mg -Denies side effects such as N/V/D.  Denies constipation. Denies abdominal pain    Previous medication  Wegovy 1.7 mg weekly- experienced N/V while on this dose. Medication was discontinued due to side effects     2. Lifestyle:   Without medication notices food talk. While on Zepbound appetite is suppressed. Eating smaller portions. Less cravings  Physical Activity: Currently increased to exercising 4 days per week. Walking on treadmill for 30 min 3 days and then walking outside with dtr around the neighborhood.       Objective     ASCVD Risk:  The 10-year ASCVD risk score (Yuan LORENZ, et al., 2019) is: 1%    Values used to calculate the score:      Age: 49 years      Sex: Female      Is Non- : No      Diabetic: No      Tobacco smoker: No      Systolic Blood Pressure: 116 mmHg      Is BP treated: Yes      HDL Cholesterol: 54 mg/dL      Total Cholesterol: 140 mg/dL     Vitals:    Wt Readings from Last 5 Encounters:   07/18/24 85.3 kg  (188 lb)   03/08/24 85.7 kg (189 lb)   02/06/24 82.7 kg (182 lb 5.1 oz)   12/13/23 82.7 kg (182 lb 6.4 oz)   09/06/23 87.7 kg (193 lb 6.4 oz)       BP Readings from Last 3 Encounters:   07/18/24 116/76   03/08/24 152/80   12/19/23 120/82       Pulse Readings from Last 3 Encounters:   07/18/24 78   03/08/24 73   12/19/23 75     Weight on home scale:  Baseline: 238 lbs  4/3/23: 230 lbs  5/1/23: 225 lbs   5/30/23: 214 lbs   9/6/23: 193 lbs  10/9/23: 185 lbs   12/20/23: 171 lbs   2/5/24: 172.5 lbs   4/15/24: 203.5 lbs  5/9/24: 205 lbs  6/13/24: 197 lbs  7/11/24: 187.5 lbs  8/9/24: 180 lbs   9/9/24: 176 lbs    Labs:  Lab Results   Component Value Date    SODIUM 141 02/10/2024    K 4.2 02/10/2024     02/10/2024    CO2 31 02/10/2024    AGAP 7 02/10/2024    BUN 11 02/10/2024    CREATININE 0.69 02/10/2024    GLUC 96 07/18/2023    GLUF 89 02/10/2024    CALCIUM 9.3 02/10/2024    AST 16 02/10/2024    ALT 17 02/10/2024    ALKPHOS 56 02/10/2024    TP 6.6 02/10/2024    TBILI 0.37 02/10/2024    EGFR 101 02/10/2024     Pharmacist Tracking Tool       Pharmacist Tracking Tool  Reason For Outreach: Embedded Pharmacist  Demographics:  Intervention Method: [employer] Message  Type of Intervention: Follow-Up  Topics Addressed: Obesity  Pharmacologic Interventions: N/A  Non-Pharmacologic Interventions: Disease state education, Home Monitoring and Medication/Device education  Time:  Direct Patient Care:  10  mins  Care Coordination:  10  mins  Recommendation Recipient: Patient/Caregiver and Provider  Outcome: Accepted

## 2024-09-10 DIAGNOSIS — I10 BENIGN ESSENTIAL HTN: ICD-10-CM

## 2024-09-10 RX ORDER — LOSARTAN POTASSIUM 100 MG/1
100 TABLET ORAL DAILY
Qty: 90 TABLET | Refills: 1 | Status: SHIPPED | OUTPATIENT
Start: 2024-09-10

## 2024-09-19 ENCOUNTER — OFFICE VISIT (OUTPATIENT)
Dept: FAMILY MEDICINE CLINIC | Facility: CLINIC | Age: 49
End: 2024-09-19
Payer: COMMERCIAL

## 2024-09-19 VITALS
BODY MASS INDEX: 24.3 KG/M2 | OXYGEN SATURATION: 97 % | WEIGHT: 174.25 LBS | DIASTOLIC BLOOD PRESSURE: 82 MMHG | TEMPERATURE: 97.4 F | HEART RATE: 78 BPM | SYSTOLIC BLOOD PRESSURE: 126 MMHG

## 2024-09-19 DIAGNOSIS — Z00.00 ANNUAL PHYSICAL EXAM: Primary | ICD-10-CM

## 2024-09-19 PROCEDURE — 99396 PREV VISIT EST AGE 40-64: CPT | Performed by: FAMILY MEDICINE

## 2024-09-19 NOTE — PROGRESS NOTES
Assessment/Plan:       1. Annual physical exam  Assessment & Plan:  Reviewed age-appropriate health maintenance and preventive care.          Subjective:      Patient ID: Breann Hudson is a 49 y.o. female.    Breann is doing well.  Her BMI now is wnl!.  She feels well.  Zepbound is working well.  She has no concerns.  Meds and labs reviewed.  She does not smoke.  She does not drink.  She had a negative cologuard in 2023.  She has a gyn.  She is utd on mammogram.        The following portions of the patient's history were reviewed and updated as appropriate: allergies, current medications, past family history, past medical history, past social history, past surgical history, and problem list.    Review of Systems   Respiratory: Negative.     Cardiovascular: Negative.          Objective:      /82 (BP Location: Left arm, Patient Position: Sitting, Cuff Size: Standard)   Pulse 78   Temp (!) 97.4 °F (36.3 °C) (Temporal)   Wt 79 kg (174 lb 4 oz)   LMP 08/24/2021 (Approximate)   SpO2 97%   BMI 24.30 kg/m²          Physical Exam  Vitals and nursing note reviewed.   Constitutional:       Appearance: Normal appearance.   HENT:      Head: Normocephalic and atraumatic.      Nose: Nose normal.      Mouth/Throat:      Mouth: Mucous membranes are moist.   Eyes:      Extraocular Movements: Extraocular movements intact.      Pupils: Pupils are equal, round, and reactive to light.   Cardiovascular:      Rate and Rhythm: Normal rate and regular rhythm.      Pulses: Normal pulses.   Pulmonary:      Effort: Pulmonary effort is normal.      Breath sounds: Normal breath sounds.   Abdominal:      General: Bowel sounds are normal.      Palpations: Abdomen is soft.   Musculoskeletal:      Cervical back: Normal range of motion.   Skin:     General: Skin is warm and dry.      Capillary Refill: Capillary refill takes less than 2 seconds.   Neurological:      General: No focal deficit present.      Mental Status: She is alert.    Psychiatric:         Mood and Affect: Mood normal.

## 2024-10-30 ENCOUNTER — TELEPHONE (OUTPATIENT)
Dept: FAMILY MEDICINE CLINIC | Facility: CLINIC | Age: 49
End: 2024-10-30

## 2024-10-30 DIAGNOSIS — Z79.899 ENCOUNTER FOR LONG-TERM (CURRENT) USE OF MEDICATIONS: Primary | ICD-10-CM

## 2024-10-30 RX ORDER — TIRZEPATIDE 10 MG/.5ML
10 INJECTION, SOLUTION SUBCUTANEOUS WEEKLY
Qty: 2 ML | Refills: 5 | Status: SHIPPED | OUTPATIENT
Start: 2024-10-30

## 2024-10-30 NOTE — TELEPHONE ENCOUNTER
[facility]  Marjan Grissom, Pharmacist     Breann Hudson is a 49 y.o. female who was referred to the pharmacist for weight management medication education referred by Robert Budinetz, MD .        Assessment/ Plan     Assessment & Plan  Encounter for long-term (current) use of medications    Previous history of Obesity with BMI now controlled  Baseline Weight: 239 lbs   Baseline BMI: 33.2   Most recent Weight: 165.5 lb (home scale)  % total body weight lost from baseline: 30%  Weight loss: Stage 2 - BMI > 25 with at least one severe complication or requires more aggressive weight loss for effective treatment - add pharmacotherapy with BMI > 27, consider bariatric surgery with BMI > 35 per the AACE/ACE guidelines.    Patient's weight-related disease of complication: HTN, hyperlipidemia, depression     Weight currently at goal. BMI within normal range. Decrease Zepbound to 10 mg weekly for maintenance dose..     Orders:    tirzepatide (Zepbound) 10 mg/0.5 mL auto-injector; Inject 0.5 mL (10 mg total) under the skin once a week         Subjective        Current medication   Zepbound 15 mg -Denies side effects such as N/V/D.  Denies constipation. Denies abdominal pain     Previous medication  Wegovy 1.7 mg weekly- experienced N/V while on this dose. Medication was discontinued due to side effects       Lifestyle:   Without medication notices food talk. While on Zepbound appetite is suppressed. Eating smaller portions. Less cravings  Physical Activity: Currently increased to exercising 4 days per week. Walking on treadmill for 30 min 3 days and then walking outside with dtr around the neighborhood.       Objective       ASCVD Risk:  The 10-year ASCVD risk score (Yuan LORENZ, et al., 2019) is: 1.1%    Values used to calculate the score:      Age: 49 years      Sex: Female      Is Non- : No      Diabetic: No      Tobacco smoker: No      Systolic Blood Pressure:  126 mmHg      Is BP treated: Yes      HDL Cholesterol: 54 mg/dL      Total Cholesterol: 140 mg/dL     Vitals:    Wt Readings from Last 5 Encounters:   09/19/24 79 kg (174 lb 4 oz)   07/18/24 85.3 kg (188 lb)   03/08/24 85.7 kg (189 lb)   02/06/24 82.7 kg (182 lb 5.1 oz)   12/13/23 82.7 kg (182 lb 6.4 oz)       BP Readings from Last 3 Encounters:   09/19/24 126/82   07/18/24 116/76   03/08/24 152/80       Pulse Readings from Last 3 Encounters:   09/19/24 78   07/18/24 78   03/08/24 73       Labs:  Lab Results   Component Value Date    SODIUM 141 02/10/2024    K 4.2 02/10/2024     02/10/2024    CO2 31 02/10/2024    AGAP 7 02/10/2024    BUN 11 02/10/2024    CREATININE 0.69 02/10/2024    GLUC 96 07/18/2023    GLUF 89 02/10/2024    CALCIUM 9.3 02/10/2024    AST 16 02/10/2024    ALT 17 02/10/2024    ALKPHOS 56 02/10/2024    TP 6.6 02/10/2024    TBILI 0.37 02/10/2024    EGFR 101 02/10/2024         Pharmacist Tracking Tool       Pharmacist Tracking Tool  Reason For Outreach: Embedded Pharmacist  Demographics:  Intervention Method: [employer] Message  Type of Intervention: Follow-Up  Topics Addressed: Obesity  Pharmacologic Interventions: Dose or Frequency Adjusted  Non-Pharmacologic Interventions: Medication/Device education  Time:  Direct Patient Care:  10  mins  Care Coordination:  5  mins  Recommendation Recipient: Patient/Caregiver and Provider  Outcome: Accepted

## 2024-10-30 NOTE — ASSESSMENT & PLAN NOTE
Previous history of Obesity with BMI now controlled  Baseline Weight: 239 lbs   Baseline BMI: 33.2   Most recent Weight: 165.5 lb (home scale)  % total body weight lost from baseline: 30%  Weight loss: Stage 2 - BMI > 25 with at least one severe complication or requires more aggressive weight loss for effective treatment - add pharmacotherapy with BMI > 27, consider bariatric surgery with BMI > 35 per the AACE/ACE guidelines.    Patient's weight-related disease of complication: HTN, hyperlipidemia, depression     Weight currently at goal. BMI within normal range. Decrease Zepbound to 10 mg weekly for maintenance dose..     Orders:    tirzepatide (Zepbound) 10 mg/0.5 mL auto-injector; Inject 0.5 mL (10 mg total) under the skin once a week

## 2024-11-07 ENCOUNTER — APPOINTMENT (OUTPATIENT)
Dept: LAB | Facility: IMAGING CENTER | Age: 49
End: 2024-11-07
Payer: COMMERCIAL

## 2024-11-07 DIAGNOSIS — Z79.899 NEED FOR PROPHYLACTIC CHEMOTHERAPY: ICD-10-CM

## 2024-11-07 LAB
25(OH)D3 SERPL-MCNC: 40.3 NG/ML (ref 30–100)
ALBUMIN SERPL BCG-MCNC: 4.4 G/DL (ref 3.5–5)
ALP SERPL-CCNC: 53 U/L (ref 34–104)
ALT SERPL W P-5'-P-CCNC: 18 U/L (ref 7–52)
ANION GAP SERPL CALCULATED.3IONS-SCNC: 8 MMOL/L (ref 4–13)
AST SERPL W P-5'-P-CCNC: 16 U/L (ref 13–39)
BASOPHILS # BLD AUTO: 0.05 THOUSANDS/ÂΜL (ref 0–0.1)
BASOPHILS NFR BLD AUTO: 1 % (ref 0–1)
BILIRUB SERPL-MCNC: 0.39 MG/DL (ref 0.2–1)
BUN SERPL-MCNC: 11 MG/DL (ref 5–25)
CALCIUM SERPL-MCNC: 9 MG/DL (ref 8.4–10.2)
CARBAMAZEPINE SERPL-MCNC: 5.3 UG/ML (ref 4–12)
CHLORIDE SERPL-SCNC: 105 MMOL/L (ref 96–108)
CHOLEST SERPL-MCNC: 115 MG/DL
CO2 SERPL-SCNC: 28 MMOL/L (ref 21–32)
CREAT SERPL-MCNC: 0.57 MG/DL (ref 0.6–1.3)
EOSINOPHIL # BLD AUTO: 0.06 THOUSAND/ÂΜL (ref 0–0.61)
EOSINOPHIL NFR BLD AUTO: 1 % (ref 0–6)
ERYTHROCYTE [DISTWIDTH] IN BLOOD BY AUTOMATED COUNT: 11.4 % (ref 11.6–15.1)
EST. AVERAGE GLUCOSE BLD GHB EST-MCNC: 88 MG/DL
FOLATE SERPL-MCNC: >22.3 NG/ML
GFR SERPL CREATININE-BSD FRML MDRD: 107 ML/MIN/1.73SQ M
GLUCOSE P FAST SERPL-MCNC: 83 MG/DL (ref 65–99)
HBA1C MFR BLD: 4.7 %
HCT VFR BLD AUTO: 44.2 % (ref 34.8–46.1)
HDLC SERPL-MCNC: 42 MG/DL
HGB BLD-MCNC: 14.7 G/DL (ref 11.5–15.4)
IMM GRANULOCYTES # BLD AUTO: 0.02 THOUSAND/UL (ref 0–0.2)
IMM GRANULOCYTES NFR BLD AUTO: 0 % (ref 0–2)
LDLC SERPL CALC-MCNC: 58 MG/DL (ref 0–100)
LYMPHOCYTES # BLD AUTO: 1.43 THOUSANDS/ÂΜL (ref 0.6–4.47)
LYMPHOCYTES NFR BLD AUTO: 21 % (ref 14–44)
MCH RBC QN AUTO: 33.6 PG (ref 26.8–34.3)
MCHC RBC AUTO-ENTMCNC: 33.3 G/DL (ref 31.4–37.4)
MCV RBC AUTO: 101 FL (ref 82–98)
MONOCYTES # BLD AUTO: 0.35 THOUSAND/ÂΜL (ref 0.17–1.22)
MONOCYTES NFR BLD AUTO: 5 % (ref 4–12)
NEUTROPHILS # BLD AUTO: 4.9 THOUSANDS/ÂΜL (ref 1.85–7.62)
NEUTS SEG NFR BLD AUTO: 72 % (ref 43–75)
NONHDLC SERPL-MCNC: 73 MG/DL
NRBC BLD AUTO-RTO: 0 /100 WBCS
PLATELET # BLD AUTO: 293 THOUSANDS/UL (ref 149–390)
PMV BLD AUTO: 9.1 FL (ref 8.9–12.7)
POTASSIUM SERPL-SCNC: 4.7 MMOL/L (ref 3.5–5.3)
PROT SERPL-MCNC: 6.7 G/DL (ref 6.4–8.4)
RBC # BLD AUTO: 4.38 MILLION/UL (ref 3.81–5.12)
SODIUM SERPL-SCNC: 141 MMOL/L (ref 135–147)
T4 FREE SERPL-MCNC: 0.81 NG/DL (ref 0.61–1.12)
TRIGL SERPL-MCNC: 76 MG/DL
TSH SERPL DL<=0.05 MIU/L-ACNC: 1.92 UIU/ML (ref 0.45–4.5)
VIT B12 SERPL-MCNC: 1362 PG/ML (ref 180–914)
WBC # BLD AUTO: 6.81 THOUSAND/UL (ref 4.31–10.16)

## 2024-11-07 PROCEDURE — 80156 ASSAY CARBAMAZEPINE TOTAL: CPT

## 2024-11-07 PROCEDURE — 36415 COLL VENOUS BLD VENIPUNCTURE: CPT

## 2024-11-07 PROCEDURE — 85025 COMPLETE CBC W/AUTO DIFF WBC: CPT

## 2024-11-07 PROCEDURE — 80053 COMPREHEN METABOLIC PANEL: CPT

## 2024-11-07 PROCEDURE — 83036 HEMOGLOBIN GLYCOSYLATED A1C: CPT

## 2024-11-07 PROCEDURE — 80061 LIPID PANEL: CPT

## 2024-11-07 PROCEDURE — 82746 ASSAY OF FOLIC ACID SERUM: CPT

## 2024-11-07 PROCEDURE — 84439 ASSAY OF FREE THYROXINE: CPT

## 2024-11-07 PROCEDURE — 84443 ASSAY THYROID STIM HORMONE: CPT

## 2024-11-07 PROCEDURE — 82607 VITAMIN B-12: CPT

## 2024-11-07 PROCEDURE — 82306 VITAMIN D 25 HYDROXY: CPT

## 2024-12-03 DIAGNOSIS — E78.49 OTHER HYPERLIPIDEMIA: ICD-10-CM

## 2024-12-04 RX ORDER — ATORVASTATIN CALCIUM 80 MG/1
80 TABLET, FILM COATED ORAL DAILY
Qty: 90 TABLET | Refills: 1 | Status: SHIPPED | OUTPATIENT
Start: 2024-12-04

## 2025-01-05 DIAGNOSIS — I10 BENIGN ESSENTIAL HTN: ICD-10-CM

## 2025-01-06 RX ORDER — AMLODIPINE BESYLATE 5 MG/1
5 TABLET ORAL DAILY
Qty: 90 TABLET | Refills: 0 | Status: SHIPPED | OUTPATIENT
Start: 2025-01-06

## 2025-01-09 DIAGNOSIS — F41.9 ANXIETY: ICD-10-CM

## 2025-01-09 RX ORDER — ALPRAZOLAM 0.25 MG/1
0.25 TABLET ORAL DAILY PRN
Qty: 90 TABLET | Refills: 0 | Status: SHIPPED | OUTPATIENT
Start: 2025-01-09

## 2025-01-15 ENCOUNTER — OFFICE VISIT (OUTPATIENT)
Dept: FAMILY MEDICINE CLINIC | Facility: CLINIC | Age: 50
End: 2025-01-15
Payer: COMMERCIAL

## 2025-01-15 VITALS
HEIGHT: 71 IN | HEART RATE: 78 BPM | SYSTOLIC BLOOD PRESSURE: 122 MMHG | OXYGEN SATURATION: 99 % | BODY MASS INDEX: 22.4 KG/M2 | DIASTOLIC BLOOD PRESSURE: 84 MMHG | WEIGHT: 160 LBS

## 2025-01-15 DIAGNOSIS — B96.89 ACUTE BACTERIAL SINUSITIS: ICD-10-CM

## 2025-01-15 DIAGNOSIS — M25.411 PAIN AND SWELLING OF SHOULDER, RIGHT: Primary | ICD-10-CM

## 2025-01-15 DIAGNOSIS — M25.511 PAIN AND SWELLING OF SHOULDER, RIGHT: Primary | ICD-10-CM

## 2025-01-15 DIAGNOSIS — M54.12 CERVICAL RADICULOPATHY: ICD-10-CM

## 2025-01-15 DIAGNOSIS — J01.90 ACUTE BACTERIAL SINUSITIS: ICD-10-CM

## 2025-01-15 PROCEDURE — 99214 OFFICE O/P EST MOD 30 MIN: CPT | Performed by: FAMILY MEDICINE

## 2025-01-15 RX ORDER — DESVENLAFAXINE 50 MG/1
50 TABLET, FILM COATED, EXTENDED RELEASE ORAL
COMMUNITY
Start: 2024-12-31

## 2025-01-15 RX ORDER — DOXYCYCLINE HYCLATE 100 MG
100 TABLET ORAL 2 TIMES DAILY
Qty: 14 TABLET | Refills: 0 | Status: SHIPPED | OUTPATIENT
Start: 2025-01-15 | End: 2025-01-22

## 2025-01-15 RX ORDER — CYCLOBENZAPRINE HCL 10 MG
10 TABLET ORAL
Qty: 30 TABLET | Refills: 1 | Status: SHIPPED | OUTPATIENT
Start: 2025-01-15

## 2025-01-15 NOTE — PROGRESS NOTES
"Name: Breann Hudson      : 1975      MRN: [id number]  Encounter Provider: Robert Budinetz, MD  Encounter Date: 1/15/2025   Encounter department[facility]  :  Assessment & Plan  Pain and swelling of shoulder, right  Ref to PT  Start flexeril prn  Orders:    Ambulatory Referral to Physical Therapy; Future    cyclobenzaprine (FLEXERIL) 10 mg tablet; Take 1 tablet (10 mg total) by mouth daily at bedtime    Cervical radiculopathy  Ref to PT  Start flexeril prn  Orders:    Ambulatory Referral to Physical Therapy; Future    cyclobenzaprine (FLEXERIL) 10 mg tablet; Take 1 tablet (10 mg total) by mouth daily at bedtime    Acute bacterial sinusitis  Start doxy  Orders:    doxycycline hyclate (VIBRA-TABS) 100 mg tablet; Take 1 tablet (100 mg total) by mouth 2 (two) times a day for 7 days           History of Present Illness     Breann has a several month hx of pain in her right posterior neck and shoulder.  Her shoulder exam is not bad.  Rotator cuff is ok.  No acute injury or trauma.  It is not improving with heat etc.  Also sinus pain and pressure triggering a migraine.  She has pain behind her left eye.  No fever.  No significant mucus.      Review of Systems   Respiratory: Negative.     Cardiovascular: Negative.    Gastrointestinal: Negative.        Objective   /84 (BP Location: Left arm, Patient Position: Sitting, Cuff Size: Standard)   Pulse 78   Ht 5' 11\" (1.803 m)   Wt 72.6 kg (160 lb)   LMP 2021 (Approximate)   SpO2 99%   BMI 22.32 kg/m²      Physical Exam  Vitals and nursing note reviewed.   Constitutional:       General: She is not in acute distress.     Appearance: She is well-developed.   HENT:      Head: Normocephalic and atraumatic.   Eyes:      Conjunctiva/sclera: Conjunctivae normal.   Cardiovascular:      Rate and Rhythm: Normal rate and regular rhythm.      Heart sounds: No murmur heard.  Pulmonary:      Effort: Pulmonary effort is normal. No respiratory " distress.      Breath sounds: Normal breath sounds.   Abdominal:      Palpations: Abdomen is soft.      Tenderness: There is no abdominal tenderness.   Musculoskeletal:         General: No swelling.      Cervical back: Neck supple.      Comments: Right infraspinatus weakness   Skin:     General: Skin is warm and dry.      Capillary Refill: Capillary refill takes less than 2 seconds.   Neurological:      Mental Status: She is alert.   Psychiatric:         Mood and Affect: Mood normal.

## 2025-01-21 ENCOUNTER — EVALUATION (OUTPATIENT)
Dept: PHYSICAL THERAPY | Facility: REHABILITATION | Age: 50
End: 2025-01-21
Payer: COMMERCIAL

## 2025-01-21 DIAGNOSIS — M54.12 CERVICAL RADICULOPATHY: ICD-10-CM

## 2025-01-21 DIAGNOSIS — M25.511 PAIN AND SWELLING OF SHOULDER, RIGHT: Primary | ICD-10-CM

## 2025-01-21 DIAGNOSIS — M25.411 PAIN AND SWELLING OF SHOULDER, RIGHT: Primary | ICD-10-CM

## 2025-01-21 PROCEDURE — 97535 SELF CARE MNGMENT TRAINING: CPT

## 2025-01-21 PROCEDURE — 97162 PT EVAL MOD COMPLEX 30 MIN: CPT

## 2025-01-21 NOTE — PROGRESS NOTES
PT Evaluation     Today's date: 2025  Patient name: Breann Hudson  : 1975  MRN: [id number]  Referring provider: Budinetz, Robert, MD  Dx:   Encounter Diagnosis     ICD-10-CM    1. Pain and swelling of shoulder, right  M25.511 Ambulatory Referral to Physical Therapy    M25.411       2. Cervical radiculopathy  M54.12 Ambulatory Referral to Physical Therapy                     Assessment  Impairments: abnormal muscle firing, abnormal muscle tone, abnormal or restricted ROM, abnormal movement, activity intolerance, impaired physical strength, lacks appropriate home exercise program, pain with function, poor posture  and poor body mechanics    Assessment details: Pt, Breann Hudson , is a 50 y.o.  presenting to physical therapy on this date for an initial evaluation with reports of R sided and shoulder pain without known CRISTIAN. Pt denied any radiculopathy symptoms or red flags upon exam on today's date. Upon eval on this date, pt presented with decreased cervical and thoracic ROM, WFL R shoulder strength, and overall impaired tolerance to functional activities. Pt was educated on ergonomics to help set up her home and work space, posture, and HEP exercises to help decrease tightness of her cervical musculature and to improve her posture. At this time, pt would benefit from skilled OP PT to work on deficits listed above and improve overall functional mobility with decreased reports of pain and compensation patterns. POC was discussed with pt, pt verbalized understanding and is in agreement. Thank you for this referral.    Goals  STG:   Pt will report 25% or greater decrease in pain in 2 weeks to demonstrate improved tolerance to functional activities   Pt will demonstrate 25% improvements in ROM in 4 weeks   Pt will be I with initial HEP to progress towards independence with exercise     LTG:   Pt will be able to sleep at night without waking with pain by d/c   Pt will improve thoracic ROM to WFL by d/c for  increased ease and safety with ADLs and overall functional mobility   Pt will be able to complete a work day without pain >2/10 by d/c to demonstrate improved tolerance to functional activities and daily tasks  Pt will be I with modified/updated HEP and demonstrate ability to complete with confidence and proper mechanics/form to safely be d/c from skilled OP PT and continue with exercises on their own        Plan  Patient would benefit from: PT eval and skilled physical therapy  Planned modality interventions: cryotherapy and thermotherapy: hydrocollator packs    Planned therapy interventions: body mechanics training, functional ROM exercises, home exercise program, therapeutic exercise, therapeutic activities, stretching, strengthening, postural training, patient education, neuromuscular re-education, motor coordination training, manual therapy, joint mobilization, nerve gliding and abdominal trunk stabilization    Frequency: 1-2x week  Duration in weeks: 6  Treatment plan discussed with: patient        Subjective Evaluation    History of Present Illness  Mechanism of injury: Pt reports that she has been dealing with shoulder pain for months. She reports that her neck and her shoulders are tight, more so on her right side. She reports that her pain does go down her R arm to her elbow. She reports that heat patches she puts on did help. She denies any known CRISTIAN. She reports that she has bad posture where she sits at a monitor all day long and she is the tallest person so she knows she slouches. Pt denies numbness in her arm but states it feels like more of the muscle is restricted and tight. She reports that she has tried stretching and massage. She reports that her right side gets really irritated when her arm is up while she is trying. Pt reports that she did brake her R arm 2 years ago which she had PT for.   Patient Goals  Patient goals for therapy: decreased pain, increased motion, increased strength,  independence with ADLs/IADLs and return to sport/leisure activities  Patient goal: learn how to stretch and manage the pain  Pain  Current pain ratin  At worst pain ratin  Quality: tight, discomfort and dull ache  Alleviating factors: heat patches.  Exacerbated by: sitting for prolonged time, lying down.    Social Support    Employment status: working (Desk work)  Treatments  Treatments tried: stretches.        Objective     Concurrent Complaints  Positive for disturbed sleep. Negative for dizziness, faints, nausea/motion sickness, tinnitus, trouble swallowing, difficulty breathing and shortness of breathHeadaches: pt reports hx of migraines.    Static Posture     Comments  Forward head, rounded shoulders    Palpation   Left   Hypertonic in the cervical paraspinals, levator scapulae, scalenes, sternocleidomastoid, suboccipitals and upper trapezius.   Tenderness of the levator scapulae and upper trapezius.     Right   Hypertonic in the cervical paraspinals, levator scapulae, scalenes, sternocleidomastoid, suboccipitals and upper trapezius.   Tenderness of the levator scapulae and upper trapezius.     Tenderness     Left Shoulder   No tenderness in the clavicle, lateral scapula, medial scapula and scapular spine.     Right Shoulder  No tenderness in the clavicle, lateral scapula, medial scapula and scapular spine.     Neurological Testing     Sensation   Cervical/Thoracic   Left   Intact: light touch    Right   Intact: light touch    Active Range of Motion   Cervical/Thoracic Spine       Cervical    Flexion:  WFL  Extension:  WFL  Left lateral flexion: 35 degrees      Right lateral flexion: 40 degrees      Left rotation:  WFL  Right rotation:  WFL    Thoracic    Flexion:  Restriction level: moderate  Extension:  Restriction level: moderate  Left lateral flexion:  Restriction level: moderate  Right lateral flexion:  Restriction level: moderate    Right Shoulder   Flexion: WFL  Abduction: WFL  External rotation  BTH: WFL  Internal rotation BTB: WFL    Additional Active Range of Motion Details  Significant hinge point at approx T8-T10 with forward flexion and extension     Strength/Myotome Testing     Right Shoulder     Planes of Motion   Flexion: 5   Abduction: 5   External rotation at 0°: 5   Internal rotation at 0°: 5   Neuro Exam:     Headaches   Headaches: pt reports hx of migraines.              Precautions:   Patient Active Problem List   Diagnosis    Pain in right wrist    Wrist strain, right, initial encounter    Fracture follow-up    Medial epicondylitis of right elbow    Bipolar affective disorder, current episode depressed (HCC)    Essential hypertension    Hyperlipidemia    Status post endometrial ablation    Status post hysteroscopy    Anxiety    Chronic fatigue    Annual physical exam    Encounter for long-term (current) use of medications    Migraine without aura and without status migrainosus, not intractable    Other B-complex deficiencies    Vitamin D deficiency         1/21            Visit number  1            FOTO NP             IE/RE IE                Manuals                                                                 Neuro Re-Ed             Chin tucks              Scap retractions  demo                                                                             Ther Ex             UBE standing              UT, LS stretch  Demo            Sidelying open book             Cat camel             Supine foam roller protocol              TB rows              TB Ext              W's                                                                  Desk ergonomics, set up ML                         Modalities

## 2025-01-28 ENCOUNTER — OFFICE VISIT (OUTPATIENT)
Dept: PHYSICAL THERAPY | Facility: REHABILITATION | Age: 50
End: 2025-01-28
Payer: COMMERCIAL

## 2025-01-28 DIAGNOSIS — M25.511 PAIN AND SWELLING OF SHOULDER, RIGHT: Primary | ICD-10-CM

## 2025-01-28 DIAGNOSIS — M54.12 CERVICAL RADICULOPATHY: ICD-10-CM

## 2025-01-28 DIAGNOSIS — M25.411 PAIN AND SWELLING OF SHOULDER, RIGHT: Primary | ICD-10-CM

## 2025-01-28 PROCEDURE — 97530 THERAPEUTIC ACTIVITIES: CPT

## 2025-01-28 PROCEDURE — 97110 THERAPEUTIC EXERCISES: CPT

## 2025-01-28 NOTE — PROGRESS NOTES
Daily Note     Today's date: 2025  Patient name: Breann Hudson  : 1975  MRN: [id number]  Referring provider: Budinetz, Robert, MD  Dx:   Encounter Diagnosis     ICD-10-CM    1. Pain and swelling of shoulder, right  M25.511     M25.411       2. Cervical radiculopathy  M54.12                      Subjective: Pt reports that she worked on changing her home and office set up but has not noticed a big difference yet. She reports that she brought her backpack with her today to work on the mechanics with it.       Objective: See treatment diary below      Assessment: Pt tolerated treatment well. Backpack mechanics and placement with straps and pack were shown to patient, similar to her desk set up pt was educated to trial shortening lengthening the straps at small increments to see what feels best for her, pt verbalized understanding. Pt was educated that due to completing new exercises on this date, she may feel some soreness later like she did a workout but it should not be an increase in pain. Pt was educated to inform therapist at next appointment how she felt so that plan of care can be adjusted as needed, pt verbalized understanding.  Patient demonstrated fatigue post treatment, exhibited good technique with therapeutic exercises, and would benefit from continued PT.       Plan: Continue per plan of care.  Progress treatment as tolerated.       Precautions:   Patient Active Problem List   Diagnosis    Pain in right wrist    Wrist strain, right, initial encounter    Fracture follow-up    Medial epicondylitis of right elbow    Bipolar affective disorder, current episode depressed (HCC)    Essential hypertension    Hyperlipidemia    Status post endometrial ablation    Status post hysteroscopy    Anxiety    Chronic fatigue    Annual physical exam    Encounter for long-term (current) use of medications    Migraine without aura and without status migrainosus, not intractable    Other B-complex deficiencies     "Vitamin D deficiency         1/21 1/28           Visit number  1            FOTO NP             IE/RE IE                Manuals                                                                 Neuro Re-Ed             Chin tucks              Scap retractions  demo                                                                             Ther Ex             UBE standing   2'/2'           UT, LS stretch  Demo            Sidelying open book             Cat camel  5\"x10 ea              Thread the needle 3\" x10 ea            Supine foam roller protocol   Supine pec stretch on foam roller 20\"x3            TB rows   Seated thoracic ext 10\" x5            TB Ext              W's                                                                  Desk ergonomics, set up ML Back pack mechanics info                        Modalities                                             1/28/25  Access Code: [email or web address]  URL: [email or web address]  Date: 01/28/2025  Prepared by: Karolina Olivares    Exercises  - Cat Cow  - 1-2 x daily - 7 x weekly - 10 reps - 5\" hold  - Quadruped Full Range Thoracic Rotation with Reach  - 1-2 x daily - 7 x weekly - 10 reps - 3\"-5\" hold  - Seated Thoracic Lumbar Extension with Pectoralis Stretch  - 1-2 x daily - 7 x weekly - 5 reps - 10 hold     "

## 2025-02-04 ENCOUNTER — OFFICE VISIT (OUTPATIENT)
Dept: PHYSICAL THERAPY | Facility: REHABILITATION | Age: 50
End: 2025-02-04
Payer: COMMERCIAL

## 2025-02-04 DIAGNOSIS — M25.411 PAIN AND SWELLING OF SHOULDER, RIGHT: Primary | ICD-10-CM

## 2025-02-04 DIAGNOSIS — M25.511 PAIN AND SWELLING OF SHOULDER, RIGHT: Primary | ICD-10-CM

## 2025-02-04 DIAGNOSIS — M54.12 CERVICAL RADICULOPATHY: ICD-10-CM

## 2025-02-04 PROCEDURE — 97530 THERAPEUTIC ACTIVITIES: CPT

## 2025-02-04 PROCEDURE — 97110 THERAPEUTIC EXERCISES: CPT

## 2025-02-04 NOTE — PROGRESS NOTES
"Daily Note     Today's date: 2025  Patient name: Breann Hudson  : 1975  MRN: [id number]  Referring provider: Budinetz, Robert, MD  Dx:   Encounter Diagnosis     ICD-10-CM    1. Pain and swelling of shoulder, right  M25.511     M25.411       2. Cervical radiculopathy  M54.12                      Subjective: Pt reports that she has not been doing her exercises as much at home as she should be since she has a big deadline to reach by 11am tomorrow. She reports that after last time she was here she felt like she got beat up and that feeling last for 1-2 days but is no longer there. Pt reports that her backpack did feel better after adjusting it.       Objective: See treatment diary below      Assessment: Pt tolerated treatment well. Although still limited, pt did demonstrate improve thoracic extension ROM with seated thoracic ext in chair compared to last visit. PT did well with remainder of exercises and by end of session reported she felt \"stretched out.\" Pt was provided  with an updated HEP to include sidelying open book stretch. Patient demonstrated fatigue post treatment, exhibited good technique with therapeutic exercises, and would benefit from continued PT.       Plan: Continue per plan of care.  Progress treatment as tolerated.       Precautions:   Patient Active Problem List   Diagnosis    Pain in right wrist    Wrist strain, right, initial encounter    Fracture follow-up    Medial epicondylitis of right elbow    Bipolar affective disorder, current episode depressed (HCC)    Essential hypertension    Hyperlipidemia    Status post endometrial ablation    Status post hysteroscopy    Anxiety    Chronic fatigue    Annual physical exam    Encounter for long-term (current) use of medications    Migraine without aura and without status migrainosus, not intractable    Other B-complex deficiencies    Vitamin D deficiency                    Visit number  1            FOTO NP             IE/RE IE  " "              Manuals                                                                 Neuro Re-Ed             Chin tucks              Scap retractions  demo                                                                             Ther Ex             UBE standing   2'/2' 3'/3'          UT, LS stretch  Demo            Sidelying open book   5\" x10 b/l           Cat camel  5\"x10 ea  5\" x10 ea             Thread the needle 3\" x10 ea  Thread the needle 3\" x10 ea           Supine foam roller protocol   Supine pec stretch on foam roller 20\"x3  Supine pec stretch on foam roller 20\"x3           TB rows   Seated thoracic ext 10\" x5  Seated thoracic ext 10\" x5           TB Ext              W's                                                                  Desk ergonomics, set up ML Back pack mechanics info                        Modalities                                             1/28/25  Access Code: [email or web address]  URL: [email or web address]  Date: 01/28/2025  Prepared by: Karolina Olivares    Exercises  - Cat Cow  - 1-2 x daily - 7 x weekly - 10 reps - 5\" hold  - Quadruped Full Range Thoracic Rotation with Reach  - 1-2 x daily - 7 x weekly - 10 reps - 3\"-5\" hold  - Seated Thoracic Lumbar Extension with Pectoralis Stretch  - 1-2 x daily - 7 x weekly - 5 reps - 10 hold      2/4/25  Access Code: [email or web address]  URL: [email or web address]  Date: 02/04/2025  Prepared by: Karolina Olivares    Exercises  - Sidelying Open Book Thoracic Lumbar Rotation and Extension  - 1-2 x daily - 7 x weekly - 10 reps - 5 hold     "

## 2025-02-07 ENCOUNTER — PATIENT MESSAGE (OUTPATIENT)
Dept: FAMILY MEDICINE CLINIC | Facility: CLINIC | Age: 50
End: 2025-02-07

## 2025-02-07 DIAGNOSIS — E66.811 OBESITY (BMI 30.0-34.9): Primary | ICD-10-CM

## 2025-02-07 RX ORDER — TIRZEPATIDE 5 MG/.5ML
5 INJECTION, SOLUTION SUBCUTANEOUS WEEKLY
Qty: 2 ML | Refills: 5 | Status: SHIPPED | OUTPATIENT
Start: 2025-02-07

## 2025-02-07 NOTE — PATIENT COMMUNICATION
"Prior Authorization Clinical Questions for Weight Management Pharmacotherapy    1. Does the patient have a contrainidcation to medication prescribed for weight management?: No  2. Does the patient have a diagnosis of obesity, confirmed by a BMI greater than or equal to 30 kg/m^2?: No  3. Does the patient have a BMI of greater than or equal to 27 kg/m^2 with at least one weight-related comorbidity/risk factor/complication (e.g. diabetes, dyslipidemia, coronary artery disease)?: No  5. Has the patient been on a weight loss regimen of low-calorie diet, increased physical activity, and lifestyle modifications for a minimum of 6 months?: Yes  6. Has the patient completed a comprehensive weight loss program (ie, Weight Watchers, Noom, Bariatrics, other juan r on phone)? If so, what?: Yes  7. Does the patient have a history of type 2 diabetes?: No  For renewals: Has the patient had a positive outcome with current weight management medication (i.e., change in body weight of at least 4-5% after 12-16 weeks on maximally tolerated dose)?: Yes     Baseline weight (in pounds): 203.5 lbs  Current weight (in pounds): 152 lbs  Weight loss percentage: -25.31%          Current BMI:  Estimated body mass index is 22.32 kg/m² as calculated from the following:    Height as of 1/15/25: 5' 11\" (1.803 m).    Weight as of 1/15/25: 72.6 kg (160 lb).    Patient would like to maintain current weight and not interested in losing more weight. Current BMI within normal range. This is for CONTINUATION of therapy.     Plan:  Decrease dose to Zepbound 5 mg weekly.     Pharmacist Tracking Tool  Reason For Outreach: Embedded Pharmacist  Demographics:  Intervention Method: [employer] Groupt Message  Type of Intervention: Follow-Up  Topics Addressed: Obesity  Pharmacologic Interventions: Dose or Frequency Adjusted  Non-Pharmacologic Interventions: Medication/Device education  Time:  Direct Patient Care:  10  mins  Care Coordination:  10  mins  Recommendation " Recipient: Patient/Caregiver and Provider  Outcome: Accepted

## 2025-02-11 ENCOUNTER — APPOINTMENT (OUTPATIENT)
Dept: PHYSICAL THERAPY | Facility: REHABILITATION | Age: 50
End: 2025-02-11
Payer: COMMERCIAL

## 2025-02-11 ENCOUNTER — TELEPHONE (OUTPATIENT)
Age: 50
End: 2025-02-11

## 2025-02-11 NOTE — TELEPHONE ENCOUNTER
PA for Zepbound 5mg/0.5mL SUBMITTED to [employer]    via    []CMM-KEY:   [x]Surescripts-Case ID # [id number]   []Availity-Auth ID # NDC #   []Faxed to plan   []Other website   []Phone call Case ID #     [x]PA sent as URGENT    All office notes, labs and other pertaining documents and studies sent. Clinical questions answered. Awaiting determination from insurance company.     Turnaround time for your insurance to make a decision on your Prior Authorization can take 7-21 business days.

## 2025-02-18 ENCOUNTER — APPOINTMENT (OUTPATIENT)
Dept: PHYSICAL THERAPY | Facility: REHABILITATION | Age: 50
End: 2025-02-18
Payer: COMMERCIAL

## 2025-02-20 NOTE — TELEPHONE ENCOUNTER
PA for Zepbound 5mg/0.5mL APPROVED     Date(s) approved 03/22/2025-03/22/2026    Case #[id number]     Patient advised by          [x]MyChart Message  []Phone call   []LMOM  []L/M to call office as no active Communication consent on file  [x]Unable to leave detailed message as VM not approved on Communication consent       Pharmacy advised by    [x]Fax  []Phone call     Approval letter scanned into Media No waiting for letter

## 2025-02-25 ENCOUNTER — APPOINTMENT (OUTPATIENT)
Dept: PHYSICAL THERAPY | Facility: REHABILITATION | Age: 50
End: 2025-02-25
Payer: COMMERCIAL

## 2025-03-05 ENCOUNTER — HOSPITAL ENCOUNTER (OUTPATIENT)
Dept: MAMMOGRAPHY | Facility: MEDICAL CENTER | Age: 50
Discharge: HOME | End: 2025-03-05
Payer: COMMERCIAL

## 2025-03-05 VITALS — WEIGHT: 160 LBS | BODY MASS INDEX: 22.4 KG/M2 | HEIGHT: 71 IN

## 2025-03-05 DIAGNOSIS — Z12.31 VISIT FOR SCREENING MAMMOGRAM: ICD-10-CM

## 2025-03-05 PROCEDURE — 77067 SCR MAMMO BI INCL CAD: CPT

## 2025-03-05 PROCEDURE — 77063 BREAST TOMOSYNTHESIS BI: CPT

## 2025-03-07 ENCOUNTER — RESULTS FOLLOW-UP (OUTPATIENT)
Dept: FAMILY MEDICINE CLINIC | Facility: CLINIC | Age: 50
End: 2025-03-07

## 2025-03-10 DIAGNOSIS — I10 BENIGN ESSENTIAL HTN: ICD-10-CM

## 2025-03-11 RX ORDER — LOSARTAN POTASSIUM 100 MG/1
100 TABLET ORAL DAILY
Qty: 90 TABLET | Refills: 1 | Status: SHIPPED | OUTPATIENT
Start: 2025-03-11

## 2025-03-20 ENCOUNTER — OFFICE VISIT (OUTPATIENT)
Dept: FAMILY MEDICINE CLINIC | Facility: CLINIC | Age: 50
End: 2025-03-20
Payer: COMMERCIAL

## 2025-03-20 VITALS
SYSTOLIC BLOOD PRESSURE: 112 MMHG | OXYGEN SATURATION: 98 % | HEIGHT: 71 IN | WEIGHT: 156.38 LBS | TEMPERATURE: 97.6 F | HEART RATE: 76 BPM | DIASTOLIC BLOOD PRESSURE: 80 MMHG | BODY MASS INDEX: 21.89 KG/M2

## 2025-03-20 DIAGNOSIS — I10 ESSENTIAL HYPERTENSION: Primary | ICD-10-CM

## 2025-03-20 PROCEDURE — 99214 OFFICE O/P EST MOD 30 MIN: CPT | Performed by: FAMILY MEDICINE

## 2025-03-20 NOTE — PROGRESS NOTES
"Name: Breann Hudson      : 1975      MRN: [id number]  Encounter Provider: Robert Budinetz, MD  Encounter Date: 3/20/2025   Encounter department: Counts include 234 beds at the [facility]  :  Assessment & Plan  Essential hypertension  Decrease norvasc to 2.5mg qd                History of Present Illness   Doing well.  BP is running a bit low but she has lost a very significant amount of weight on zepbound.  No syncope.  Will decrease norvasc to 2.5mg qd.  No CP or sob.      Review of Systems   Respiratory: Negative.     Cardiovascular: Negative.    Gastrointestinal: Negative.        Objective   /80 (BP Location: Right arm, Patient Position: Sitting, Cuff Size: Standard)   Pulse 76   Temp 97.6 °F (36.4 °C) (Temporal)   Ht 5' 11\" (1.803 m)   Wt 70.9 kg (156 lb 6 oz)   LMP 2021 (Approximate)   SpO2 98%   BMI 21.81 kg/m²      Physical Exam  Vitals and nursing note reviewed.   Constitutional:       General: She is not in acute distress.     Appearance: She is well-developed.   HENT:      Head: Normocephalic and atraumatic.   Eyes:      Conjunctiva/sclera: Conjunctivae normal.   Cardiovascular:      Rate and Rhythm: Normal rate and regular rhythm.      Heart sounds: No murmur heard.  Pulmonary:      Effort: Pulmonary effort is normal. No respiratory distress.      Breath sounds: Normal breath sounds.   Abdominal:      Palpations: Abdomen is soft.      Tenderness: There is no abdominal tenderness.   Musculoskeletal:         General: No swelling.      Cervical back: Neck supple.   Skin:     General: Skin is warm and dry.      Capillary Refill: Capillary refill takes less than 2 seconds.   Neurological:      Mental Status: She is alert.   Psychiatric:         Mood and Affect: Mood normal.         "

## 2025-05-13 DIAGNOSIS — F41.9 ANXIETY: ICD-10-CM

## 2025-05-13 RX ORDER — ALPRAZOLAM 0.25 MG
0.25 TABLET ORAL DAILY PRN
Qty: 90 TABLET | Refills: 0 | Status: SHIPPED | OUTPATIENT
Start: 2025-05-13

## 2025-06-17 DIAGNOSIS — E78.49 OTHER HYPERLIPIDEMIA: ICD-10-CM

## 2025-06-17 RX ORDER — ATORVASTATIN CALCIUM 80 MG/1
80 TABLET, FILM COATED ORAL DAILY
Qty: 90 TABLET | Refills: 1 | Status: SHIPPED | OUTPATIENT
Start: 2025-06-17

## 2025-07-17 DIAGNOSIS — E66.9 OBESITY (BMI 30-39.9): Primary | ICD-10-CM

## 2025-07-17 RX ORDER — TIRZEPATIDE 7.5 MG/.5ML
7.5 INJECTION, SOLUTION SUBCUTANEOUS WEEKLY
Qty: 2 ML | Refills: 0 | Status: SHIPPED | OUTPATIENT
Start: 2025-07-17

## 2025-08-20 DIAGNOSIS — E66.9 OBESITY (BMI 30-39.9): ICD-10-CM

## 2025-08-21 RX ORDER — TIRZEPATIDE 7.5 MG/.5ML
7.5 INJECTION, SOLUTION SUBCUTANEOUS WEEKLY
Qty: 2 ML | Refills: 0 | Status: SHIPPED | OUTPATIENT
Start: 2025-08-21

## (undated) DEVICE — PVC URETHRAL CATHETER: Brand: DOVER

## (undated) DEVICE — IV EXTENSION TUBING 33 IN

## (undated) DEVICE — STRL ALLENTOWN HYSTEROSCOPY PK: Brand: CARDINAL HEALTH

## (undated) DEVICE — DRAPE EQUIPMENT RF WAND

## (undated) DEVICE — CYSTO TUBING SINGLE IRRIGATION

## (undated) DEVICE — GLOVE INDICATOR PI UNDERGLOVE SZ 7 BLUE

## (undated) DEVICE — SCD SEQUENTIAL COMPRESSION COMFORT SLEEVE MEDIUM KNEE LENGTH: Brand: KENDALL SCD

## (undated) DEVICE — 2000CC GUARDIAN II: Brand: GUARDIAN

## (undated) DEVICE — GLOVE PI ULTRA TOUCH SZ.7.0

## (undated) DEVICE — UNDER BUTTOCKS DRAPE W/FLUID CONTROL POUCH: Brand: CONVERTORS

## (undated) DEVICE — PREMIUM DRY TRAY LF: Brand: MEDLINE INDUSTRIES, INC.

## (undated) DEVICE — STERILE MINERVA DISPOSABLE HANDPIECECONTENTS:(1) ONE SINGLE USE STERILE MINERVA ES DISPOSABLE HANDPIECE (1) ONE SINGLE USE STERILE SYRINGE(1) ONE SINGLE USE STERILE 8MM HEGAR DILATOR(1) ONE SINGLE USE NON-STERILE DESICCANT(1) ONE NON-STERILE HANDPIECE INSTRUCTIONS FOR USE(1)  ONE NON-STERILE DILATOR INSTRUCTIONS FOR USE: Brand: MINERVA SINGLE STERILE DISPOSABLE HANDPIECE

## (undated) DEVICE — STERILE 8 INCH PROCTO SWAB: Brand: CARDINAL HEALTH